# Patient Record
Sex: MALE | Race: OTHER | HISPANIC OR LATINO
[De-identification: names, ages, dates, MRNs, and addresses within clinical notes are randomized per-mention and may not be internally consistent; named-entity substitution may affect disease eponyms.]

---

## 2018-05-24 ENCOUNTER — APPOINTMENT (OUTPATIENT)
Dept: NEPHROLOGY | Facility: CLINIC | Age: 18
End: 2018-05-24
Payer: MEDICAID

## 2018-05-24 VITALS
WEIGHT: 178.57 LBS | DIASTOLIC BLOOD PRESSURE: 91 MMHG | SYSTOLIC BLOOD PRESSURE: 145 MMHG | HEIGHT: 65.35 IN | OXYGEN SATURATION: 98 % | HEART RATE: 83 BPM | BODY MASS INDEX: 29.39 KG/M2

## 2018-05-24 DIAGNOSIS — D50.9 IRON DEFICIENCY ANEMIA, UNSPECIFIED: ICD-10-CM

## 2018-05-24 DIAGNOSIS — N25.81 SECONDARY HYPERPARATHYROIDISM OF RENAL ORIGIN: ICD-10-CM

## 2018-05-24 DIAGNOSIS — Z83.3 FAMILY HISTORY OF DIABETES MELLITUS: ICD-10-CM

## 2018-05-24 DIAGNOSIS — E87.5 HYPERKALEMIA: ICD-10-CM

## 2018-05-24 DIAGNOSIS — E55.9 VITAMIN D DEFICIENCY, UNSPECIFIED: ICD-10-CM

## 2018-05-24 DIAGNOSIS — Z87.448 PERSONAL HISTORY OF OTHER DISEASES OF URINARY SYSTEM: ICD-10-CM

## 2018-05-24 DIAGNOSIS — Z87.440 PERSONAL HISTORY OF URINARY (TRACT) INFECTIONS: ICD-10-CM

## 2018-05-24 PROCEDURE — 99204 OFFICE O/P NEW MOD 45 MIN: CPT

## 2018-05-25 PROBLEM — E55.9 VITAMIN D DEFICIENCY, UNSPECIFIED: Status: ACTIVE | Noted: 2018-05-25

## 2018-05-25 PROBLEM — N25.81 SECONDARY HYPERPARATHYROIDISM, RENAL: Status: ACTIVE | Noted: 2018-05-25

## 2018-05-25 PROBLEM — E87.5 HYPERKALEMIA: Status: ACTIVE | Noted: 2018-05-25

## 2018-05-25 LAB
25(OH)D3 SERPL-MCNC: 16.7 NG/ML
ALBUMIN SERPL ELPH-MCNC: 3.8 G/DL
ANION GAP SERPL CALC-SCNC: 19 MMOL/L
APPEARANCE: CLEAR
BACTERIA: NEGATIVE
BASOPHILS # BLD AUTO: 0.05 K/UL
BASOPHILS NFR BLD AUTO: 0.5 %
BILIRUBIN URINE: NEGATIVE
BLOOD URINE: ABNORMAL
BUN SERPL-MCNC: 85 MG/DL
CALCIUM SERPL-MCNC: 8.8 MG/DL
CALCIUM SERPL-MCNC: 8.8 MG/DL
CHLORIDE SERPL-SCNC: 103 MMOL/L
CO2 SERPL-SCNC: 17 MMOL/L
COLOR: YELLOW
CREAT SERPL-MCNC: 7.79 MG/DL
CREAT SPEC-SCNC: 57 MG/DL
CREAT/PROT UR: 7.5 RATIO
EOSINOPHIL # BLD AUTO: 0.13 K/UL
EOSINOPHIL NFR BLD AUTO: 1.4 %
GLUCOSE QUALITATIVE U: 100 MG/DL
GLUCOSE SERPL-MCNC: 90 MG/DL
HBV CORE IGG+IGM SER QL: NONREACTIVE
HBV CORE IGM SER QL: NONREACTIVE
HBV SURFACE AB SER QL: NONREACTIVE
HBV SURFACE AG SER QL: NONREACTIVE
HCT VFR BLD CALC: 29.9 %
HCV AB SER QL: NONREACTIVE
HCV S/CO RATIO: 0.13 S/CO
HGB BLD-MCNC: 9.8 G/DL
HYALINE CASTS: 0 /LPF
IMM GRANULOCYTES NFR BLD AUTO: 0.2 %
KETONES URINE: NEGATIVE
LEUKOCYTE ESTERASE URINE: NEGATIVE
LYMPHOCYTES # BLD AUTO: 1.68 K/UL
LYMPHOCYTES NFR BLD AUTO: 18.1 %
MAN DIFF?: NORMAL
MCHC RBC-ENTMCNC: 28.1 PG
MCHC RBC-ENTMCNC: 32.8 GM/DL
MCV RBC AUTO: 85.7 FL
MICROSCOPIC-UA: NORMAL
MONOCYTES # BLD AUTO: 0.42 K/UL
MONOCYTES NFR BLD AUTO: 4.5 %
NEUTROPHILS # BLD AUTO: 6.96 K/UL
NEUTROPHILS NFR BLD AUTO: 75.3 %
NITRITE URINE: NEGATIVE
PARATHYROID HORMONE INTACT: 659 PG/ML
PH URINE: 6.5
PHOSPHATE SERPL-MCNC: 6 MG/DL
PLATELET # BLD AUTO: 278 K/UL
POTASSIUM SERPL-SCNC: 5.4 MMOL/L
PROT UR-MCNC: 426 MG/DL
PROTEIN URINE: 300 MG/DL
RBC # BLD: 3.49 M/UL
RBC # FLD: 12.7 %
RED BLOOD CELLS URINE: 6 /HPF
SODIUM SERPL-SCNC: 139 MMOL/L
SPECIFIC GRAVITY URINE: 1.01
SQUAMOUS EPITHELIAL CELLS: 1 /HPF
TACROLIMUS SERPL-MCNC: 3.6 NG/ML
UROBILINOGEN URINE: NEGATIVE MG/DL
WBC # FLD AUTO: 9.26 K/UL
WHITE BLOOD CELLS URINE: 2 /HPF

## 2018-05-29 LAB
CMV DNA SPEC QL NAA+PROBE: NOT DETECTED IU/ML
HBV E AG SER QL: NEGATIVE

## 2018-05-31 LAB — BKV DNA SPEC QL NAA+PROBE: NORMAL

## 2018-07-19 ENCOUNTER — APPOINTMENT (OUTPATIENT)
Dept: NEPHROLOGY | Facility: CLINIC | Age: 18
End: 2018-07-19

## 2018-09-01 ENCOUNTER — OUTPATIENT (OUTPATIENT)
Dept: OUTPATIENT SERVICES | Facility: HOSPITAL | Age: 18
LOS: 1 days | End: 2018-09-01
Payer: MEDICAID

## 2018-09-01 DIAGNOSIS — Z94.0 KIDNEY TRANSPLANT STATUS: Chronic | ICD-10-CM

## 2018-09-06 ENCOUNTER — INPATIENT (INPATIENT)
Facility: HOSPITAL | Age: 18
LOS: 8 days | Discharge: HOME | DRG: 699 | End: 2018-09-15
Attending: INTERNAL MEDICINE | Admitting: HOSPITALIST
Payer: MEDICAID

## 2018-09-06 VITALS
OXYGEN SATURATION: 99 % | DIASTOLIC BLOOD PRESSURE: 81 MMHG | RESPIRATION RATE: 18 BRPM | SYSTOLIC BLOOD PRESSURE: 137 MMHG | HEART RATE: 115 BPM | TEMPERATURE: 98 F

## 2018-09-06 DIAGNOSIS — N17.9 ACUTE KIDNEY FAILURE, UNSPECIFIED: ICD-10-CM

## 2018-09-06 DIAGNOSIS — Z94.0 KIDNEY TRANSPLANT STATUS: Chronic | ICD-10-CM

## 2018-09-06 LAB
ANION GAP SERPL CALC-SCNC: 28 MMOL/L — HIGH (ref 5–17)
APPEARANCE UR: CLEAR — SIGNIFICANT CHANGE UP
BACTERIA # UR AUTO: NEGATIVE — SIGNIFICANT CHANGE UP
BASOPHILS # BLD AUTO: 0.1 K/UL — SIGNIFICANT CHANGE UP (ref 0–0.2)
BASOPHILS NFR BLD AUTO: 0.8 % — SIGNIFICANT CHANGE UP (ref 0–2)
BILIRUB UR-MCNC: NEGATIVE — SIGNIFICANT CHANGE UP
BLD GP AB SCN SERPL QL: NEGATIVE — SIGNIFICANT CHANGE UP
BUN SERPL-MCNC: 161 MG/DL — HIGH (ref 7–23)
BURR CELLS BLD QL SMEAR: PRESENT — SIGNIFICANT CHANGE UP
CALCIUM SERPL-MCNC: 5.3 MG/DL — CRITICAL LOW (ref 8.4–10.5)
CHLORIDE SERPL-SCNC: 98 MMOL/L — SIGNIFICANT CHANGE UP (ref 96–108)
CO2 SERPL-SCNC: 12 MMOL/L — LOW (ref 22–31)
COLOR SPEC: SIGNIFICANT CHANGE UP
COMMENT - URINE: SIGNIFICANT CHANGE UP
CREAT SERPL-MCNC: 22.25 MG/DL — HIGH (ref 0.5–1.3)
DIFF PNL FLD: ABNORMAL
ELLIPTOCYTES BLD QL SMEAR: SLIGHT — SIGNIFICANT CHANGE UP
EOSINOPHIL # BLD AUTO: 0 K/UL — SIGNIFICANT CHANGE UP (ref 0–0.5)
EOSINOPHIL NFR BLD AUTO: 0.4 % — SIGNIFICANT CHANGE UP (ref 0–6)
EPI CELLS # UR: 1 /HPF — SIGNIFICANT CHANGE UP
GAS PNL BLDV: SIGNIFICANT CHANGE UP
GLUCOSE SERPL-MCNC: 134 MG/DL — HIGH (ref 70–99)
GLUCOSE UR QL: NEGATIVE — SIGNIFICANT CHANGE UP
HCT VFR BLD CALC: 19.2 % — CRITICAL LOW (ref 39–50)
HGB BLD-MCNC: 6.5 G/DL — CRITICAL LOW (ref 13–17)
HYALINE CASTS # UR AUTO: 0 /LPF — SIGNIFICANT CHANGE UP (ref 0–2)
HYPOCHROMIA BLD QL: SIGNIFICANT CHANGE UP
KETONES UR-MCNC: NEGATIVE — SIGNIFICANT CHANGE UP
LEUKOCYTE ESTERASE UR-ACNC: NEGATIVE — SIGNIFICANT CHANGE UP
LYMPHOCYTES # BLD AUTO: 1.8 K/UL — SIGNIFICANT CHANGE UP (ref 1–3.3)
LYMPHOCYTES # BLD AUTO: 25.5 % — SIGNIFICANT CHANGE UP (ref 13–44)
MCHC RBC-ENTMCNC: 27.4 PG — SIGNIFICANT CHANGE UP (ref 27–34)
MCHC RBC-ENTMCNC: 33.7 GM/DL — SIGNIFICANT CHANGE UP (ref 32–36)
MCV RBC AUTO: 81.1 FL — SIGNIFICANT CHANGE UP (ref 80–100)
MONOCYTES # BLD AUTO: 0.5 K/UL — SIGNIFICANT CHANGE UP (ref 0–0.9)
MONOCYTES NFR BLD AUTO: 6.7 % — SIGNIFICANT CHANGE UP (ref 2–14)
NEUTROPHILS # BLD AUTO: 4.7 K/UL — SIGNIFICANT CHANGE UP (ref 1.8–7.4)
NEUTROPHILS NFR BLD AUTO: 66.6 % — SIGNIFICANT CHANGE UP (ref 43–77)
NITRITE UR-MCNC: NEGATIVE — SIGNIFICANT CHANGE UP
PH UR: 6 — SIGNIFICANT CHANGE UP (ref 5–8)
PLAT MORPH BLD: NORMAL — SIGNIFICANT CHANGE UP
PLATELET # BLD AUTO: 142 K/UL — LOW (ref 150–400)
POIKILOCYTOSIS BLD QL AUTO: SLIGHT — SIGNIFICANT CHANGE UP
POTASSIUM SERPL-MCNC: 4.1 MMOL/L — SIGNIFICANT CHANGE UP (ref 3.5–5.3)
POTASSIUM SERPL-SCNC: 4.1 MMOL/L — SIGNIFICANT CHANGE UP (ref 3.5–5.3)
PROT UR-MCNC: ABNORMAL
RBC # BLD: 2.37 M/UL — LOW (ref 4.2–5.8)
RBC # FLD: 12.2 % — SIGNIFICANT CHANGE UP (ref 10.3–14.5)
RBC BLD AUTO: ABNORMAL
RBC CASTS # UR COMP ASSIST: 1 /HPF — SIGNIFICANT CHANGE UP (ref 0–4)
RH IG SCN BLD-IMP: POSITIVE — SIGNIFICANT CHANGE UP
RH IG SCN BLD-IMP: POSITIVE — SIGNIFICANT CHANGE UP
SCHISTOCYTES BLD QL AUTO: SLIGHT — SIGNIFICANT CHANGE UP
SODIUM SERPL-SCNC: 138 MMOL/L — SIGNIFICANT CHANGE UP (ref 135–145)
SP GR SPEC: 1.01 — SIGNIFICANT CHANGE UP (ref 1.01–1.02)
UROBILINOGEN FLD QL: NEGATIVE — SIGNIFICANT CHANGE UP
WBC # BLD: 7 K/UL — SIGNIFICANT CHANGE UP (ref 3.8–10.5)
WBC # FLD AUTO: 7 K/UL — SIGNIFICANT CHANGE UP (ref 3.8–10.5)
WBC UR QL: 12 /HPF — HIGH (ref 0–5)

## 2018-09-06 PROCEDURE — 99223 1ST HOSP IP/OBS HIGH 75: CPT | Mod: GC

## 2018-09-06 PROCEDURE — 71046 X-RAY EXAM CHEST 2 VIEWS: CPT | Mod: 26

## 2018-09-06 PROCEDURE — 99285 EMERGENCY DEPT VISIT HI MDM: CPT | Mod: 25

## 2018-09-06 PROCEDURE — 93010 ELECTROCARDIOGRAM REPORT: CPT

## 2018-09-06 NOTE — ED PROVIDER NOTE - CARE PLAN
Principal Discharge DX:	Acute renal failure superimposed on chronic kidney disease, unspecified CKD stage, unspecified acute renal failure type  Secondary Diagnosis:	Metabolic acidemia  Secondary Diagnosis:	Anemia in chronic kidney disease, unspecified CKD stage

## 2018-09-06 NOTE — ED ADULT NURSE NOTE - NSIMPLEMENTINTERV_GEN_ALL_ED
Implemented All Universal Safety Interventions:  [place] to call system. Call bell, personal items and telephone within reach. Instruct patient to call for assistance. Room bathroom lighting operational. Non-slip footwear when patient is off stretcher. Physically safe environment: no spills, clutter or unnecessary equipment. Stretcher in lowest position, wheels locked, appropriate side rails in place.

## 2018-09-06 NOTE — ED ADULT NURSE NOTE - OBJECTIVE STATEMENT
19 yo male complaining of nausea for 3 days and discomfort. pt has hx of 10 years kidney transplant, Fistula on left arm - arm precautions on left arm. Pt denies nausea and vomiting. Mild edema on both extremities, pt alerted and oriented x3, ambulating without difficulty, just complaining about generalized discomfort. IV placed on right AC 20G, blood drawn, sent to lab. pt collecting urine. Pt will start dialysis in december. Will continue to monitor closely.

## 2018-09-06 NOTE — ED PROVIDER NOTE - OBJECTIVE STATEMENT
19 yo male with PMHx of kidney transplant 10 years ago, HTN, Anemia, 17 yo male with PMHx of atrophic kidney s/p transplant 10 years ago, rejection of transplant and plans to start dialysis this month (fistula placed in December 2017), HTN, Anemia, presents with generalized malaise and nausea x 3 days. Endorses vague abdominal discomfort, abdominal distention, increased swelling in bilateral lower extremities. Denies fever, chills, shortness of breath, vomiting, diarrhea, new focal neurological symptoms, chest pain/ pressure. Patient reports makes "a normal volume" of urine two times a day.    Pt does not see PMD, only sees nephrologist Dr. Middleton

## 2018-09-06 NOTE — ED PROVIDER NOTE - MEDICAL DECISION MAKING DETAILS
MD Paris,Attending: pt seen. agree with above HPI/ROS/PE. Concerns for decreasing renal function in pt with renal transplant soon to be started on dialysis. r/o significant uremia/MA/hyperkalemia. Labs and discussion with renal

## 2018-09-07 DIAGNOSIS — D64.9 ANEMIA, UNSPECIFIED: ICD-10-CM

## 2018-09-07 DIAGNOSIS — E83.51 HYPOCALCEMIA: ICD-10-CM

## 2018-09-07 DIAGNOSIS — N18.9 CHRONIC KIDNEY DISEASE, UNSPECIFIED: ICD-10-CM

## 2018-09-07 DIAGNOSIS — E83.42 HYPOMAGNESEMIA: ICD-10-CM

## 2018-09-07 DIAGNOSIS — R10.9 UNSPECIFIED ABDOMINAL PAIN: ICD-10-CM

## 2018-09-07 DIAGNOSIS — N18.6 END STAGE RENAL DISEASE: ICD-10-CM

## 2018-09-07 DIAGNOSIS — Z29.9 ENCOUNTER FOR PROPHYLACTIC MEASURES, UNSPECIFIED: ICD-10-CM

## 2018-09-07 DIAGNOSIS — T86.12 KIDNEY TRANSPLANT FAILURE: Chronic | ICD-10-CM

## 2018-09-07 DIAGNOSIS — E83.39 OTHER DISORDERS OF PHOSPHORUS METABOLISM: ICD-10-CM

## 2018-09-07 DIAGNOSIS — N17.9 ACUTE KIDNEY FAILURE, UNSPECIFIED: ICD-10-CM

## 2018-09-07 DIAGNOSIS — T86.11 KIDNEY TRANSPLANT REJECTION: ICD-10-CM

## 2018-09-07 DIAGNOSIS — I10 ESSENTIAL (PRIMARY) HYPERTENSION: ICD-10-CM

## 2018-09-07 DIAGNOSIS — Z94.0 KIDNEY TRANSPLANT STATUS: ICD-10-CM

## 2018-09-07 DIAGNOSIS — R94.31 ABNORMAL ELECTROCARDIOGRAM [ECG] [EKG]: ICD-10-CM

## 2018-09-07 DIAGNOSIS — E87.2 ACIDOSIS: ICD-10-CM

## 2018-09-07 LAB
24R-OH-CALCIDIOL SERPL-MCNC: 11.9 NG/ML — LOW (ref 30–80)
ALBUMIN SERPL ELPH-MCNC: 4.1 G/DL — SIGNIFICANT CHANGE UP (ref 3.3–5)
ALP SERPL-CCNC: 84 U/L — SIGNIFICANT CHANGE UP (ref 40–120)
ALT FLD-CCNC: 7 U/L — LOW (ref 10–45)
ANION GAP SERPL CALC-SCNC: 29 MMOL/L — HIGH (ref 5–17)
AST SERPL-CCNC: 7 U/L — LOW (ref 10–40)
BILIRUB DIRECT SERPL-MCNC: 0.1 MG/DL — SIGNIFICANT CHANGE UP (ref 0–0.2)
BILIRUB INDIRECT FLD-MCNC: 0.3 MG/DL — SIGNIFICANT CHANGE UP (ref 0.2–1)
BILIRUB SERPL-MCNC: 0.4 MG/DL — SIGNIFICANT CHANGE UP (ref 0.2–1.2)
BUN SERPL-MCNC: 157 MG/DL — HIGH (ref 7–23)
CALCIUM SERPL-MCNC: 5.2 MG/DL — CRITICAL LOW (ref 8.4–10.5)
CALCIUM SERPL-MCNC: 5.3 MG/DL — CRITICAL LOW (ref 8.4–10.5)
CHLORIDE SERPL-SCNC: 97 MMOL/L — SIGNIFICANT CHANGE UP (ref 96–108)
CO2 SERPL-SCNC: 12 MMOL/L — LOW (ref 22–31)
CREAT SERPL-MCNC: 21.47 MG/DL — HIGH (ref 0.5–1.3)
FERRITIN SERPL-MCNC: 353 NG/ML — SIGNIFICANT CHANGE UP (ref 30–400)
GLUCOSE SERPL-MCNC: 96 MG/DL — SIGNIFICANT CHANGE UP (ref 70–99)
HAPTOGLOB SERPL-MCNC: 189 MG/DL — SIGNIFICANT CHANGE UP (ref 34–200)
HAV IGM SER-ACNC: SIGNIFICANT CHANGE UP
HBV CORE IGM SER-ACNC: SIGNIFICANT CHANGE UP
HBV SURFACE AG SER-ACNC: SIGNIFICANT CHANGE UP
HCT VFR BLD CALC: 24.4 % — LOW (ref 39–50)
HCV AB S/CO SERPL IA: 0.11 S/CO — SIGNIFICANT CHANGE UP
HCV AB SERPL-IMP: SIGNIFICANT CHANGE UP
HGB BLD-MCNC: 8.4 G/DL — LOW (ref 13–17)
IRON SATN MFR SERPL: 137 UG/DL — SIGNIFICANT CHANGE UP (ref 45–165)
LDH SERPL L TO P-CCNC: 328 U/L — HIGH (ref 50–242)
MAGNESIUM SERPL-MCNC: 1.5 MG/DL — LOW (ref 1.6–2.6)
MAGNESIUM SERPL-MCNC: 1.5 MG/DL — LOW (ref 1.6–2.6)
MCHC RBC-ENTMCNC: 28.4 PG — SIGNIFICANT CHANGE UP (ref 27–34)
MCHC RBC-ENTMCNC: 34.3 GM/DL — SIGNIFICANT CHANGE UP (ref 32–36)
MCV RBC AUTO: 82.8 FL — SIGNIFICANT CHANGE UP (ref 80–100)
PHOSPHATE SERPL-MCNC: 10.2 MG/DL — HIGH (ref 2.5–4.5)
PHOSPHATE SERPL-MCNC: 10.7 MG/DL — HIGH (ref 2.5–4.5)
PLATELET # BLD AUTO: 146 K/UL — LOW (ref 150–400)
POTASSIUM SERPL-MCNC: 3.9 MMOL/L — SIGNIFICANT CHANGE UP (ref 3.5–5.3)
POTASSIUM SERPL-SCNC: 3.9 MMOL/L — SIGNIFICANT CHANGE UP (ref 3.5–5.3)
PROT SERPL-MCNC: 7.5 G/DL — SIGNIFICANT CHANGE UP (ref 6–8.3)
PTH-INTACT FLD-MCNC: 974 PG/ML — HIGH (ref 15–65)
RBC # BLD: 2.95 M/UL — LOW (ref 4.2–5.8)
RBC # BLD: 2.97 M/UL — LOW (ref 4.2–5.8)
RBC # FLD: 12.5 % — SIGNIFICANT CHANGE UP (ref 10.3–14.5)
RETICS #: 66.3 K/UL — SIGNIFICANT CHANGE UP (ref 25–125)
RETICS/RBC NFR: 2.2 % — SIGNIFICANT CHANGE UP (ref 0.5–2.5)
SODIUM SERPL-SCNC: 138 MMOL/L — SIGNIFICANT CHANGE UP (ref 135–145)
TACROLIMUS SERPL-MCNC: <2 NG/ML — SIGNIFICANT CHANGE UP
WBC # BLD: 8.4 K/UL — SIGNIFICANT CHANGE UP (ref 3.8–10.5)
WBC # FLD AUTO: 8.4 K/UL — SIGNIFICANT CHANGE UP (ref 3.8–10.5)

## 2018-09-07 PROCEDURE — 99233 SBSQ HOSP IP/OBS HIGH 50: CPT

## 2018-09-07 PROCEDURE — 74176 CT ABD & PELVIS W/O CONTRAST: CPT | Mod: 26

## 2018-09-07 PROCEDURE — 99223 1ST HOSP IP/OBS HIGH 75: CPT | Mod: GC

## 2018-09-07 RX ORDER — SEVELAMER CARBONATE 2400 MG/1
1600 POWDER, FOR SUSPENSION ORAL THREE TIMES A DAY
Qty: 0 | Refills: 0 | Status: DISCONTINUED | OUTPATIENT
Start: 2018-09-07 | End: 2018-09-15

## 2018-09-07 RX ORDER — TACROLIMUS 5 MG/1
1 CAPSULE ORAL
Qty: 0 | Refills: 0 | COMMUNITY

## 2018-09-07 RX ORDER — TACROLIMUS 5 MG/1
6 CAPSULE ORAL
Qty: 0 | Refills: 0 | COMMUNITY

## 2018-09-07 RX ORDER — AMLODIPINE BESYLATE 2.5 MG/1
10 TABLET ORAL DAILY
Qty: 0 | Refills: 0 | Status: DISCONTINUED | OUTPATIENT
Start: 2018-09-07 | End: 2018-09-15

## 2018-09-07 RX ORDER — FERROUS SULFATE 325(65) MG
325 TABLET ORAL DAILY
Qty: 0 | Refills: 0 | Status: DISCONTINUED | OUTPATIENT
Start: 2018-09-07 | End: 2018-09-15

## 2018-09-07 RX ORDER — TACROLIMUS 5 MG/1
6 CAPSULE ORAL EVERY 12 HOURS
Qty: 0 | Refills: 0 | Status: DISCONTINUED | OUTPATIENT
Start: 2018-09-07 | End: 2018-09-07

## 2018-09-07 RX ORDER — FERROUS SULFATE 325(65) MG
1 TABLET ORAL
Qty: 0 | Refills: 0 | COMMUNITY

## 2018-09-07 RX ORDER — AMLODIPINE BESYLATE 2.5 MG/1
1 TABLET ORAL
Qty: 0 | Refills: 0 | COMMUNITY

## 2018-09-07 RX ORDER — CALCIUM GLUCONATE 100 MG/ML
1 VIAL (ML) INTRAVENOUS ONCE
Qty: 0 | Refills: 0 | Status: COMPLETED | OUTPATIENT
Start: 2018-09-07 | End: 2018-09-07

## 2018-09-07 RX ORDER — MAGNESIUM SULFATE 500 MG/ML
2 VIAL (ML) INJECTION ONCE
Qty: 0 | Refills: 0 | Status: COMPLETED | OUTPATIENT
Start: 2018-09-07 | End: 2018-09-07

## 2018-09-07 RX ORDER — SODIUM BICARBONATE 1 MEQ/ML
1950 SYRINGE (ML) INTRAVENOUS
Qty: 0 | Refills: 0 | Status: DISCONTINUED | OUTPATIENT
Start: 2018-09-07 | End: 2018-09-07

## 2018-09-07 RX ORDER — CALCIUM GLUCONATE 100 MG/ML
2 VIAL (ML) INTRAVENOUS ONCE
Qty: 0 | Refills: 0 | Status: DISCONTINUED | OUTPATIENT
Start: 2018-09-07 | End: 2018-09-07

## 2018-09-07 RX ORDER — CALCITRIOL 0.5 UG/1
0.25 CAPSULE ORAL DAILY
Qty: 0 | Refills: 0 | Status: DISCONTINUED | OUTPATIENT
Start: 2018-09-07 | End: 2018-09-09

## 2018-09-07 RX ORDER — SODIUM BICARBONATE 1 MEQ/ML
1950 SYRINGE (ML) INTRAVENOUS
Qty: 0 | Refills: 0 | Status: DISCONTINUED | OUTPATIENT
Start: 2018-09-07 | End: 2018-09-09

## 2018-09-07 RX ORDER — MYCOPHENOLATE MOFETIL 250 MG/1
400 CAPSULE ORAL EVERY 12 HOURS
Qty: 0 | Refills: 0 | Status: DISCONTINUED | OUTPATIENT
Start: 2018-09-07 | End: 2018-09-07

## 2018-09-07 RX ORDER — ONDANSETRON 8 MG/1
4 TABLET, FILM COATED ORAL ONCE
Qty: 0 | Refills: 0 | Status: COMPLETED | OUTPATIENT
Start: 2018-09-07 | End: 2018-09-07

## 2018-09-07 RX ADMIN — Medication 5 MILLIGRAM(S): at 06:10

## 2018-09-07 RX ADMIN — Medication 1950 MILLIGRAM(S): at 20:36

## 2018-09-07 RX ADMIN — Medication 1 TABLET(S): at 06:20

## 2018-09-07 RX ADMIN — SEVELAMER CARBONATE 1600 MILLIGRAM(S): 2400 POWDER, FOR SUSPENSION ORAL at 12:36

## 2018-09-07 RX ADMIN — Medication 325 MILLIGRAM(S): at 12:36

## 2018-09-07 RX ADMIN — ONDANSETRON 4 MILLIGRAM(S): 8 TABLET, FILM COATED ORAL at 03:30

## 2018-09-07 RX ADMIN — SEVELAMER CARBONATE 1600 MILLIGRAM(S): 2400 POWDER, FOR SUSPENSION ORAL at 20:35

## 2018-09-07 RX ADMIN — Medication 50 GRAM(S): at 16:40

## 2018-09-07 RX ADMIN — Medication 200 GRAM(S): at 11:22

## 2018-09-07 RX ADMIN — AMLODIPINE BESYLATE 10 MILLIGRAM(S): 2.5 TABLET ORAL at 06:10

## 2018-09-07 RX ADMIN — CALCITRIOL 0.25 MICROGRAM(S): 0.5 CAPSULE ORAL at 12:36

## 2018-09-07 RX ADMIN — Medication 1950 MILLIGRAM(S): at 06:21

## 2018-09-07 RX ADMIN — SEVELAMER CARBONATE 1600 MILLIGRAM(S): 2400 POWDER, FOR SUSPENSION ORAL at 06:20

## 2018-09-07 NOTE — CONSULT NOTE ADULT - PROBLEM SELECTOR RECOMMENDATION 5
Patient with hyperphosphatemia in setting of ESRD. Serum phosphorus noted to be elevated at 10.7. Would stop calcitriol as this can increase serum phosphorus level. Continue with sevelamer 1600 mg TID with meals. However will start on phoslo 667 mg TID with meals as well. Check intact PTH levels. Monitor serum phosphorus levels.

## 2018-09-07 NOTE — H&P ADULT - PROBLEM SELECTOR PLAN 1
plan to start RUTH Presenting with ARF leading to metabolic acidosis pH 7.26, AG 28, , SCr 22 (baseline 7.8 in 5/2018) in kidney transplant recipient. Pt was planning for initiating HD through LUE AVF (placed 12/2017). ARF likely progression of chronic kidney transplant rejection. Pt is breathing comfortably on room air.   -nephrology consulted, to see in AM  -Initiation of HD inpatient  -bruit present of LUE AVF, left arm precautions  -continue oral bicarb Presenting with ARF leading to metabolic acidosis pH 7.26, AG 28, , SCr 22 (baseline 7.8 in 5/2018) in kidney transplant recipient. Pt was planning for initiating HD through LUE AVF (placed 12/2017). ARF likely progression of chronic kidney transplant rejection. Pt is breathing comfortably on room air.   -nephrology consulted, to see in AM  -Initiation of HD inpatient  -bruit present of LUE AVF, left arm precautions  -continue oral bicarb  -symptomatic hypocalcemia with paresthesias, will give oral calcium repletion Presenting with ARF leading to metabolic acidosis pH 7.26, AG 28, , SCr 22 (baseline 7.8 in 5/2018), phos 10, Ca 5.3 in kidney transplant recipient. Pt was planning for initiating HD through LUE AVF (placed 12/2017). ARF likely progression of chronic kidney transplant rejection. Low suspicion for infectious etiology such as HIV or BK virus nephropathy. No sign hematuria, low suspicion for vasculitis.  -nephrology consulted, to see in AM  -Initiation of HD inpatient  -bruit present of LUE AVF, left arm precautions  -continue oral bicarb and calcium supplement, start phos binder  -f/u BK virus level Presenting with ARF leading to metabolic acidosis pH 7.26, AG 28, , SCr 22 (baseline 7.8 in 5/2018), phos 10, Ca 5.3 in kidney transplant recipient. Pt was planning for initiating HD through LUE AVF (placed 12/2017). ARF likely progression of chronic kidney transplant rejection. Low suspicion for infectious etiology such as HIV or BK virus nephropathy. Possibly element of nephrotoxicity 2/2 tacrolimus. No sign hematuria, low suspicion for vasculitis.  -nephrology consulted, to see in AM  -Initiation of HD inpatient  -bruit present of LUE AVF, left arm precautions  -continue oral bicarb and calcium supplement, start phos binder  -f/u BK virus level Presenting with ARF leading to metabolic acidosis pH 7.26, AG 28, , SCr 22 (baseline 7.8 in 5/2018), phos 10, Ca 5.3 in kidney transplant recipient. Pt was planning for initiating HD through LUE AVF (placed 12/2017). ARF likely progression of chronic kidney transplant rejection. Low suspicion for infectious etiology such as HIV or BK virus nephropathy. Possibly element of nephrotoxicity 2/2 tacrolimus. No sign hematuria, low suspicion for vasculitis.  -nephrology consulted, to see in AM  -Initiation of HD inpatient  -bruit present of LUE AVF, left arm precautions  -continue oral bicarb and calcium supplement, vitamin D. Start phos binder  -f/u BK virus level, intact PTH Presenting with ARF leading to metabolic acidosis pH 7.26, AG 28, , SCr 22 (baseline 7.8 in 5/2018), phos 10, Ca 5.3 in kidney transplant recipient. Pt was planning for initiating HD through LUE AVF (placed 12/2017). ARF likely progression of chronic kidney transplant rejection. Low suspicion for infectious etiology such as HIV or BK virus nephropathy. Possibly element of nephrotoxicity 2/2 tacrolimus. No sign hematuria, low suspicion for vasculitis.  -nephrology consulted, to see in AM  -Initiation of HD inpatient  -bruit present of LUE AVF, left arm precautions  -continue oral bicarb and calcium supplement, vitamin D. Start phos binder  -monitor on telemetry  -f/u BK virus level, intact PTH Presenting with ARF leading to metabolic acidosis pH 7.26, AG 28, , SCr 22 (baseline 7.8 in 5/2018), phos 10, Ca 5.3 in kidney transplant recipient. Pt was planning for initiating HD through LUE AVF (placed 12/2017). ARF likely progression of chronic kidney transplant rejection. Low suspicion for infectious etiology such as HIV or BK virus nephropathy. Possibly element of nephrotoxicity 2/2 tacrolimus. No sign hematuria, low suspicion for vasculitis.  -nephrology consulted, to see in AM  -Initiation of HD inpatient  -bruit present of LUE AVF, left arm precautions  -continue oral bicarb and calcium supplement, vitamin D. Start phos binder  -monitor on telemetry  -f/u BK virus level, intact PTH & vitamin D level  -Renally dose all meds and monitor electrolytes carefully.

## 2018-09-07 NOTE — H&P ADULT - PROBLEM SELECTOR PLAN 5
DVT:  Diet: renal replacement -continue home amlodipine Generalized abd pain associated with nausea and mild tenderness LLQ. LFTs wnl. Likely 2/2 edema/ascites  -continue to monitor Generalized abd pain associated with nausea and mild tenderness LLQ. LFTs wnl. Likely 2/2 edema/ascites versus internal bleeding  -check CT abdomen/pelvis to r/o intraabdominal pathology; no signs of acute abdomen at this point. -Patient has significant hyperphosphatemia. Would start phosphate binders and if it does not correct today by HD, patient will need telemetry bed.

## 2018-09-07 NOTE — H&P ADULT - ASSESSMENT
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

## 2018-09-07 NOTE — H&P ADULT - PROBLEM SELECTOR PLAN 2
-continue home prednisone -continue home prednisone 5mg qD, MMF 400mg q12h, tacrolimus 6mg q12h  -f/u tacrolimus dose in AM -Unclear if patient is adherent to therapy given last prescribed medication for his tacrolimus and mycophenolate was several years ago (see [employer]). Check tacrolimus level and renal to clarify appropriate dosing. Overnight renal fellow agrees.

## 2018-09-07 NOTE — H&P ADULT - NSHPREVIEWOFSYSTEMS_GEN_ALL_CORE
CONSTITUTIONAL: No weakness, fevers or chills  EYES/ENT: No visual changes;  No dysphagia  NECK: No pain or stiffness  RESPIRATORY: No cough, wheezing, hemoptysis; +SOB with deep inspiration  CARDIOVASCULAR: No chest pain or palpitations; +LE edema  GASTROINTESTINAL: +abd pain and nausea. No vomiting, or hematemesis; No diarrhea or constipation. No melena or hematochezia.  GENITOURINARY: No dysuria, frequency or hematuria  NEUROLOGICAL: No numbness or weakness  SKIN: No itching, burning, rashes, or lesions   All other review of systems is negative unless indicated above. CONSTITUTIONAL: No weakness, fevers or chills  EYES/ENT: No visual changes;  No dysphagia  NECK: No pain or stiffness  RESPIRATORY: No cough, wheezing, hemoptysis; +SOB with deep inspiration  CARDIOVASCULAR: No chest pain or palpitations; +LE edema  GASTROINTESTINAL: +abd pain and nausea. No vomiting, or hematemesis; No diarrhea or constipation. No melena or hematochezia.  GENITOURINARY: No dysuria, frequency or hematuria  NEUROLOGICAL: No numbness or weakness. +tingling or hands, no perioral numbness  SKIN: No itching, burning, rashes, or lesions   All other review of systems is negative unless indicated above. CONSTITUTIONAL: No fevers or chills  EYES/ENT: No visual changes;  No dysphagia  NECK: No pain or stiffness  RESPIRATORY: No cough, wheezing, hemoptysis; +SOB with deep inspiration  CARDIOVASCULAR: No chest pain or palpitations; +LE edema  GASTROINTESTINAL: +abd pain and nausea. No vomiting, or hematemesis; No diarrhea or constipation. No melena or hematochezia. +dark stools but chronic while on iron.   GENITOURINARY: No dysuria, frequency or hematuria  NEUROLOGICAL: No numbness or weakness. +tingling or hands, no perioral numbness  SKIN: No itching, burning, rashes, or lesions   All other review of systems is negative unless indicated above.

## 2018-09-07 NOTE — H&P ADULT - PROBLEM SELECTOR PROBLEM 1
Anemia Acute renal failure superimposed on stage 5 chronic kidney disease, not on chronic dialysis, unspecified acute renal failure type

## 2018-09-07 NOTE — CONSULT NOTE ADULT - PROBLEM SELECTOR RECOMMENDATION 4
Patient with hypocalcemia in setting of ESRD. Serum calcium noted to be 5.3. Will do HD today with high calcium bath today. If serum calcium remains low after HD or patient is symptomatic, then will need to supplement with IV calcium gluconate. Check intact PTH level. Monitor serum calcium.

## 2018-09-07 NOTE — CONSULT NOTE ADULT - PROBLEM SELECTOR RECOMMENDATION 9
Patient with h/o progressive advanced CKD in setting of chronic rejection. Labs done as outpatient showed Scr. of 7.79. However on labs done in ER, Scr. is elevated at 22.25 with BUN of 160. Patient appears uremic on examination. Patient has left UE AVF present for vascular access. Will initiate HD via AVF in view of worsening renal function and uremia. Plan is to do first session today and then second session of HD tomorrow as well. Monitor BMP daily.

## 2018-09-07 NOTE — CONSULT NOTE ADULT - PROBLEM SELECTOR RECOMMENDATION 3
Patient with anemia in setting of ESRD. Hb noted to be below goal. Check iron studies (ferritin, serum iron, and TIBC) to assess iron stores. Will start on RUTH therapy once on TIW schedule. Monitor Hb.

## 2018-09-07 NOTE — H&P ADULT - NSHPPHYSICALEXAM_GEN_ALL_CORE
GENERAL: No acute distress, well-developed  HEAD:  Atraumatic, Normocephalic  ENT: PERRLA, conjunctiva and sclera clear, Neck supple, No JVD, moist mucosa, oropharynx clear  CHEST/LUNG: Rales at bases bilaterally otherwise clear; No wheeze, equal breath sounds bilaterally, respirations nonlabored  BACK: No spinal tenderness, no CVA tenderness  HEART: Regular rate and rhythm; No murmurs, rubs, or gallops  ABDOMEN: Soft, mild LLQ tenderness, no masses, no guarding, Nondistended; Bowel sounds present  EXTREMITIES:  No clubbing, cyanosis, 1+ pretibial edema. Pedal pulses palpable  PSYCH: Nl behavior, nl affect  NEUROLOGY: AAOx3, non-focal, moves all extremities spontaneously, +paresthesias b/l hands, no perioral numbness, +asterixis of flapping hands on dorsiflexion  SKIN: Normal color, No rashes or lesions GENERAL: No acute distress, well-developed  HEAD:  Atraumatic, Normocephalic  ENT: PERRLA, conjunctiva and sclera clear, Neck supple, No JVD, moist mucosa, oropharynx clear  CHEST/LUNG: Rales at bases bilaterally otherwise clear; No wheeze, equal breath sounds bilaterally, respirations nonlabored. +bruit LUE AVF  BACK: No spinal tenderness, no CVA tenderness  HEART: Regular rate and rhythm; No murmurs, rubs, or gallops  ABDOMEN: Soft, mild LLQ tenderness, no masses, no guarding, Nondistended; Bowel sounds present  EXTREMITIES:  No clubbing, cyanosis, 1+ pretibial edema. Pedal pulses palpable  PSYCH: Nl behavior, nl affect  NEUROLOGY: AAOx3, non-focal, moves all extremities spontaneously, +paresthesias b/l hands, no perioral numbness, +asterixis of flapping hands on dorsiflexion  SKIN: Normal color, No rashes or lesions Vital Signs Last 24 Hrs  T(C): 36.8 (07 Sep 2018 04:51), Max: 36.8 (06 Sep 2018 20:24)  T(F): 98.2 (07 Sep 2018 04:51), Max: 98.2 (06 Sep 2018 20:24)  HR: 97 (07 Sep 2018 04:51) (92 - 115)  BP: 132/74 (07 Sep 2018 04:51) (127/72 - 137/81)  BP(mean): --  RR: 18 (07 Sep 2018 04:51) (18 - 18)  SpO2: 98% (07 Sep 2018 04:51) (98% - 100%)    GENERAL: No acute distress, well-developed  HEAD:  Atraumatic, Normocephalic  ENT: PERRLA, conjunctiva and sclera clear, Neck supple, No JVD, moist mucosa, oropharynx clear  Lungs: Rales at bases bilaterally otherwise clear; No wheeze, equal breath sounds bilaterally, respirations nonlabored.   BACK: No spinal tenderness, no CVA tenderness  HEART: Regular rate and rhythm; No murmurs, rubs, or gallops  ABDOMEN: Soft, mild LLQ tenderness, no masses, no guarding, Nondistended; Bowel sounds present, no flank ecchymosis.   EXTREMITIES:  No clubbing, cyanosis, 1+ pretibial edema. Pedal pulses palpable  VASCULAR: +left upper arm with fistula and thrill.   PSYCH: Nl behavior, nl affect  NEUROLOGY: AAOx3, non-focal, moves all extremities spontaneously,  no perioral numbness, +asterixis   SKIN: Normal color, No rashes or lesions

## 2018-09-07 NOTE — H&P ADULT - PROBLEM SELECTOR PLAN 6
DVT: hold pharmacologic ppx due to risk uremic bleed  Diet: renal replacement  Activity: ambulate as tolerated -continue home amlodipine -Corrected calcium is about 5.2 mg/dL. Will replete cautiously and recheck vitamin D, PTH, and calcium. Renal fellow agrees.

## 2018-09-07 NOTE — H&P ADULT - NSHPOUTPATIENTPROVIDERS_GEN_ALL_CORE
Nephrologist Dr. Shonda Middleton Nephrologist Dr. Shonda Middleton  Vascular surgeon Dr. Christofer Sanderson ([facility])

## 2018-09-07 NOTE — ED ADULT NURSE REASSESSMENT NOTE - NS ED NURSE REASSESS COMMENT FT1
Started blood transfusion, pt tolerating well, with stable vitals signs. No sings of acute distress noted at this moment, will continue to monitor closely.

## 2018-09-07 NOTE — H&P ADULT - PROBLEM SELECTOR PLAN 3
-continue iron tab Likely 2/2 CKD. Presents with acute drop in Hg with , pt is at risk for uremic bleed but denies GIB or other source blood loss  -maintain Hg >7  -s/p 1u pRBC, will give 2nd unit  -continue iron tab  -f/u iron studies (though s/p 1u pRBC), hemolysis labs Likely 2/2 CKD. Presents with acute drop in Hg with , pt is at risk for uremic bleed but denies GIB or other source blood loss. Unclear if this abdominal pain is internal bleeding or not. Will repeat CBC to see if there is appropriate response and will check CT abdomen/pelvis noncontrast to r/o RP bleed. No skin changes to support RP bleed.   -maintain Hg >7  -s/p 1u pRBC, will give 2nd unit  -continue iron tab  -f/u iron studies (though s/p 1u pRBC), check hemolysis labs (less likely); less likely TTP despite small schistocytes on automated diff. Likely 2/2 CKD. Presents with acute drop in Hg with , pt is at risk for uremic bleed. He has dark BM but supposedly on iron. His last BM was over 24-hr ago and formed, making this less likely to be overt upper GI bleeeding. He has no hematochezia either. Unclear if this abdominal pain is internal bleeding or not. Will repeat CBC to see if there is appropriate response and will check CT abdomen/pelvis noncontrast to r/o RP bleed. No skin changes to support RP bleed.   -maintain Hg >7  -s/p 1u pRBC, will give 2nd unit  -continue iron tab  -f/u iron studies (though s/p 1u pRBC), check hemolysis labs (less likely); less likely TTP despite small schistocytes on automated diff.

## 2018-09-07 NOTE — CONSULT NOTE ADULT - SUBJECTIVE AND OBJECTIVE BOX
[facility] Division of Kidney Diseases & Hypertension  INITIAL CONSULT NOTE  [phone]--------------------------------------------------------------------------------    HPI: 18 year old male with h/o CKD stage V, HTN, kidney transplant presented with complains of nausea and generalized weakness for the past two days. Nephrology was consulted for managment of ESRD. Patient has history of CKD due to chronic reflux nephropathy and was following up with pediatric nephrology. Patient had DDRT done on 2008 and was maintained on tacrolimus, MMF, and prednisone. However renal function worsened and biopsy showed changes consistent with chronic rejection and hypertensive changes. Therefore patient had left AVF placed by vascular surgeon. Patient was recently seen by Dr. Middleton in office on 5/24/18 and labs done during visit showed elevated Sc.r of 7.79. However on labs done today in ER, Scr. was elevated at 22.25 with BUN of 161. Currently patient has complains of nausea and generalized weakness. However he denies SOB, CP, abdominal pain, or LE edema.         PAST HISTORY  --------------------------------------------------------------------------------  PAST MEDICAL & SURGICAL HISTORY:  Anemia  Hypertension  Atrophic kidney  Kidney Transplant: nov 2007 (due to renal dysplasia)  Kidney transplant failure and rejection  H/O kidney transplant    FAMILY HISTORY:  No pertinent family history in first degree relatives    PAST SOCIAL HISTORY:    ALLERGIES & MEDICATIONS  --------------------------------------------------------------------------------  Allergies    No Known Allergies    Intolerances      Standing Inpatient Medications  amLODIPine   Tablet 10 milliGRAM(s) Oral daily  calcitriol   Capsule 0.25 MICROGram(s) Oral daily  calcium carbonate 1250 mG  + Vitamin D (OsCal 500 + D) 1 Tablet(s) Oral daily  ferrous    sulfate 325 milliGRAM(s) Oral daily  predniSONE   Tablet 5 milliGRAM(s) Oral daily  sevelamer hydrochloride 1600 milliGRAM(s) Oral three times a day  sodium bicarbonate 1950 milliGRAM(s) Oral two times a day    PRN Inpatient Medications      REVIEW OF SYSTEMS  --------------------------------------------------------------------------------  Gen: No  fevers/chills, weakness  Skin: No rashes  Head/Eyes/Ears/Mouth: No headache; Normal hearing; Normal vision w/o blurriness;   Respiratory: No dyspnea, cough, wheezing, hemoptysis  CV: No chest pain,   GI: No abdominal pain, diarrhea, constipation, nausea, vomiting  : No increased frequency, dysuria, hematuria, nocturia  MSK: No joint pain/swelling;   Neuro: No dizziness/lightheadedness, weakness, seizures    All other systems were reviewed and are negative, except as noted.    VITALS/PHYSICAL EXAM  --------------------------------------------------------------------------------  T(C): 36.9 (09-07-18 @ 15:54), Max: 37.1 (09-07-18 @ 15:24)  HR: 106 (09-07-18 @ 15:54) (92 - 115)  BP: 127/68 (09-07-18 @ 15:54) (121/69 - 137/81)  RR: 18 (09-07-18 @ 15:54) (18 - 18)  SpO2: 95% (09-07-18 @ 15:54) (95% - 100%)  Wt(kg): --  Height (cm): 167.64 (09-07-18 @ 03:14)  Weight (kg): 82.1 (09-07-18 @ 04:51)  BMI (kg/m2): 29.2 (09-07-18 @ 04:51)  BSA (m2): 1.92 (09-07-18 @ 04:51)      09-06-18 @ 07:01  -  09-07-18 @ 07:00  --------------------------------------------------------  IN: 420 mL / OUT: 0 mL / NET: 420 mL    09-07-18 @ 07:01  -  09-07-18 @ 16:50  --------------------------------------------------------  IN: 50 mL / OUT: 0 mL / NET: 50 mL      Physical Exam:  	Gen: NAD  	HEENT: no JVD  	Pulm: CTA B/L  	CV:  S1S2  	Abd: +BS, soft   	Ext: No B/L Lower ext edema  	Neuro: Asterixis +  	Skin: Warm and dry   	Vascular access: Left UE AVF; good thrill and bruit present.     LABS/STUDIES  --------------------------------------------------------------------------------              8.4    8.4   >-----------<  146      [09-07-18 @ 10:33]              24.4     138  |  97  |  157  ----------------------------<  96      [09-07-18 @ 10:31]  3.9   |  12  |  21.47        Ca     5.3     [09-07-18 @ 10:31]      Mg     1.5     [09-07-18 @ 10:31]      Phos  10.7     [09-07-18 @ 10:31]    TPro  7.5  /  Alb  4.1  /  TBili  0.4  /  DBili  0.1  /  AST  7   /  ALT  7   /  AlkPhos  84  [09-06-18 @ 21:01]              [09-07-18 @ 10:31]    Creatinine Trend:  SCr 21.47 [09-07 @ 10:31]  SCr 22.25 [09-06 @ 21:01]    Urinalysis - [09-06-18 @ 21:01]      Color Light Yellow / Appearance Clear / SG 1.012 / pH 6.0      Gluc Negative / Ketone Negative  / Bili Negative / Urobili Negative       Blood Small / Protein 300 mg/dL / Leuk Est Negative / Nitrite Negative      RBC 1 / WBC 12 / Hyaline 0 / Gran  / Sq Epi  / Non Sq Epi 1 / Bacteria Negative      Iron 137, TIBC --, %sat --      [09-07-18 @ 12:05]  Vitamin D (25OH) 11.9      [09-07-18 @ 12:05]    HBsAg Nonreact      [09-07-18 @ 04:51]  HCV 0.11, Nonreact      [09-07-18 @ 04:51]

## 2018-09-07 NOTE — H&P ADULT - NSHPLABSRESULTS_GEN_ALL_CORE
6.5    7.0   )-----------( 142      ( 06 Sep 2018 21:01 )             19.2     CBC Full  -  ( 06 Sep 2018 21:01 )  WBC Count : 7.0 K/uL  Hemoglobin : 6.5 g/dL  Hematocrit : 19.2 %  Platelet Count - Automated : 142 K/uL  Mean Cell Volume : 81.1 fl  Mean Cell Hemoglobin : 27.4 pg  Mean Cell Hemoglobin Concentration : 33.7 gm/dL  Auto Neutrophil # : 4.7 K/uL  Auto Lymphocyte # : 1.8 K/uL  Auto Monocyte # : 0.5 K/uL  Auto Eosinophil # : 0.0 K/uL  Auto Basophil # : 0.1 K/uL  Auto Neutrophil % : 66.6 %  Auto Lymphocyte % : 25.5 %  Auto Monocyte % : 6.7 %  Auto Eosinophil % : 0.4 %  Auto Basophil % : 0.8 %        138  |  98  |  161<H>  ----------------------------<  134<H>  4.1   |  12<L>  |  22.25<H>    Ca    5.3<LL>      06 Sep 2018 21:01    Creatinine Trend: 22.25<--      Urinalysis Basic - ( 06 Sep 2018 21:01 )    Color: Light Yellow / Appearance: Clear / S.012 / pH: x  Gluc: x / Ketone: Negative  / Bili: Negative / Urobili: Negative   Blood: x / Protein: 300 mg/dL / Nitrite: Negative   Leuk Esterase: Negative / RBC: 1 /hpf / WBC 12 /hpf   Sq Epi: x / Non Sq Epi: 1 /hpf / Bacteria: Negative    lipase 109  AG 28  VBG pH 7.26, pCO2 30, pO2 39    EKG NSR, rate 93, QTc 534, right axis deviation, no ST changes or TWI    MICROBIOLOGY:    IMAGING:  < from: Xray Chest 2 Views PA/Lat (18 @ 21:51) >  PROCEDURE DATE:  2018    ******PRELIMINARY REPORT******    ******PRELIMINARY REPORT******        INTERPRETATION:  no urgent/emergent findings.  ******PRELIMINARY REPORT******    < end of copied text > 6.5    7.0   )-----------( 142      ( 06 Sep 2018 21:01 )             19.2     CBC Full  -  ( 06 Sep 2018 21:01 )  WBC Count : 7.0 K/uL  Hemoglobin : 6.5 g/dL  Hematocrit : 19.2 %  Platelet Count - Automated : 142 K/uL  Mean Cell Volume : 81.1 fl  Mean Cell Hemoglobin : 27.4 pg  Mean Cell Hemoglobin Concentration : 33.7 gm/dL  Auto Neutrophil # : 4.7 K/uL  Auto Lymphocyte # : 1.8 K/uL  Auto Monocyte # : 0.5 K/uL  Auto Eosinophil # : 0.0 K/uL  Auto Basophil # : 0.1 K/uL  Auto Neutrophil % : 66.6 %  Auto Lymphocyte % : 25.5 %  Auto Monocyte % : 6.7 %  Auto Eosinophil % : 0.4 %  Auto Basophil % : 0.8 %        138  |  98  |  161<H>  ----------------------------<  134<H>  4.1   |  12<L>  |  22.25<H>    Ca    5.3<LL>      06 Sep 2018 21:01    Creatinine Trend: 22.25<--      Urinalysis Basic - ( 06 Sep 2018 21:01 )    Color: Light Yellow / Appearance: Clear / S.012 / pH: x  Gluc: x / Ketone: Negative  / Bili: Negative / Urobili: Negative   Blood: x / Protein: 300 mg/dL / Nitrite: Negative   Leuk Esterase: Negative / RBC: 1 /hpf / WBC 12 /hpf   Sq Epi: x / Non Sq Epi: 1 /hpf / Bacteria: Negative    lipase 109  AG 28  VBG pH 7.26, pCO2 30, pO2 39         MICROBIOLOGY:    IMAGING:  < from: Xray Chest 2 Views PA/Lat (18 @ 21:51) >  PROCEDURE DATE:  2018    ******PRELIMINARY REPORT******    ******PRELIMINARY REPORT******        INTERPRETATION:  no urgent/emergent findings.  ******PRELIMINARY REPORT******    < end of copied text >    I personally reviewed & interpreted the lab findings above; CBC c/w normocytic anemia. CMP c/w severe renal dysfunction.   I personally reviewed & interpreted the radiographic findings; CT abdomen ordered and pending   I personally reviewed & interpreted the EKG findings; Prolonged QTc. right axis deviation, no ST changes or TWI

## 2018-09-07 NOTE — H&P ADULT - HISTORY OF PRESENT ILLNESS
19yo male with PMHx of atrophic kidney s/p transplant 10 years ago, rejection of transplant and plans to start dialysis this month (fistula placed 12/2017), HTN, anemia presents with generalized malaise and nausea x 3 days. 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 He called his nephrologist, Dr. Middleton, with his symptoms and she told him to come to the ED for likely urgent HD. Pt reports decreased urine production starting 3 days ago. Also endorses generalized crampy abdominal pain with radiation to the back exacerbated by eating, severity 7/10. He feels like his abdomen and legs are heavier. He has not had much to eat or drink due to decreased appetite. Endorses feeling short of breath when he takes a deep breath. Pt has no change from his normal routine the past few days, no recent illness or sick contacts. Denies F/C, vomiting, diarrhea, orthopnea, PND, CP, dysuria.    ED vitals: T98.2, H115, /81, R18, sat 99% on RA. Blood work significant for Hg 6.5 (baseline 9.8 in 2018), , SCr 22.2 (baseline 7.79 in 2018), pH 7.26, bicarb 12, AG 28. 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 He called his nephrologist, Dr. Middleton, with his symptoms and she told him to come to the ED for likely urgent HD. Pt reports decreased urine production starting 3 days ago. Also endorses generalized crampy abdominal pain with radiation to the back exacerbated by eating, severity 7/10. He feels like his abdomen and legs are heavier. He has not had much to eat or drink due to decreased appetite. Endorses feeling short of breath when he takes a deep breath. Pt has no change from his normal routine the past few days, no recent illness or sick contacts. Denies F/C, vomiting, diarrhea, orthopnea, PND, CP, dysuria.    ED vitals: T98.2, H115, /81, R18, sat 99% on RA. Blood work significant for Hg 6.5 (baseline 9.8 in 2018), , SCr 22.2 (baseline 7.79 in 2018), pH 7.26, bicarb 12, AG 28. Received 1u pRBC and zofran. Nephrology consulted in ED and pt to be seen in AM. NSR with QTc 534 on EKG. 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 He called his nephrologist, Dr. Middleton, with his symptoms and she told him to come to the ED for likely urgent HD. Pt reports decreased urine production starting 3 days ago. Also endorses generalized crampy abdominal pain with radiation to the back exacerbated by eating, severity /10. He feels like his abdomen and legs are heavier. He has not had much to eat or drink due to decreased appetite. Endorses feeling short of breath when he takes a deep breath. Endorses tingling of his hands. Pt has no change from his normal routine the past few days, no recent illness or sick contacts. Pt recently initiated care with nephrologist Dr. Middleton in 2018 transitioning from peds. She started him on calcium acetate and labetalol but he couldn't tolerate as it made him nauseated. Denies F/C, vomiting, diarrhea, orthopnea, PND, CP, dysuria.    ED vitals: T98.2, H115, /81, R18, sat 99% on RA. Blood work significant for Hg 6.5 (baseline 9.8 in 2018), , SCr 22.2 (baseline 7.79 in 2018), metabolic acidosis pH 7.26, bicarb 12, AG 28. Received 1u pRBC and zofran. Nephrology consulted in ED and pt to be seen in AM. NSR with QTc 534 on EKG. 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 He called his nephrologist, Dr. Middleton, with his symptoms and she told him to come to the ED for likely urgent HD. Pt reports decreased urine production starting 3 days ago. Also endorses generalized crampy abdominal pain with radiation to the back exacerbated by eating, severity 7/10. He feels like his abdomen and legs are heavier. He has not had much to eat or drink due to decreased appetite. Endorses feeling short of breath when he takes a deep breath. Endorses tingling of his hands. Pt has no change from his normal routine the past few days, no recent illness or sick contacts. Pt recently initiated care with nephrologist Dr. Middleton in 2018 transitioning from peds. She started him on calcium acetate and labetalol but he couldn't tolerate as it made him nauseated. Pt had plans to initiate HD next month, LUE AVF placed 2017.Denies F/C, vomiting, diarrhea, orthopnea, PND, CP, dysuria.    ED vitals: T98.2, H115, /81, R18, sat 99% on RA. Blood work significant for Hg 6.5 (baseline 9.8 in 2018), , SCr 22.2 (baseline 7.79 in 2018), metabolic acidosis pH 7.26, bicarb 12, AG 28. Received 1u pRBC and zofran. Nephrology consulted in ED and pt to be seen in AM. NSR with QTc 534 on EKG. 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 He called his nephrologist, Dr. Middleton, with his symptoms and she told him to come to the ED for likely urgent HD. Pt reports decreased urine production starting 3 days ago. Also endorses generalized crampy abdominal pain with radiation to the back exacerbated by eating, severity 7/10. He feels like his abdomen and legs are heavier. He has not had much to eat or drink due to decreased appetite. Endorses feeling short of breath when he takes a deep breath. Endorses tingling of his hands. Pt has no change from his normal routine the past few days, no recent illness or sick contacts. Pt recently initiated care with nephrologist Dr. Middleton in 2018 transitioning from peds. She started him on calcium acetate and labetalol but he couldn't tolerate as it made him nauseated. Pt had plans to initiate HD next month, TUANE AVF placed 2017.Denies F/C, vomiting, diarrhea, orthopnea, PND, CP, dysuria.    ED vitals: T98.2, H115, /81, R18, sat 99% on RA. Blood work significant for Hg 6.5 (baseline 9.8 in 2018), , SCr 22.2 (baseline 7.79 in 2018), Ca 5.3, phos 10, metabolic acidosis pH 7.26, bicarb 12, AG 28. Received 1u pRBC and zofran. Nephrology consulted in ED and pt to be seen in AM. NSR with QTc 534 on EKG. 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 He called his nephrologist, Dr. Middleton, with his symptoms and she told him to come to the ED for likely HD. Pt reports decreased urine production starting 3 days ago. Also endorses generalized crampy abdominal pain with radiation to the back exacerbated by eating, severity 7/10. The abdominal pain is more diffuse but greater in left quadrants. He feels like his abdomen and legs are heavier. He has not had much to eat or drink due to decreased appetite. Endorses feeling short of breath when he takes a deep breath but no chest pain or palpitation. Endorses tingling of his hands. Pt has no change from his normal routine the past few days, no recent illness or sick contacts. Pt recently initiated care with nephrologist Dr. Middleton in 2018 transitioning from peds. She started him on calcium acetate and labetalol but he couldn't tolerate as it made him nauseated. Pt had plans to initiate HD next month, LUE AVF placed 2017.Denies F/C, vomiting, diarrhea, orthopnea, PND, CP, dysuria.    ED vitals: T98.2, H115, /81, R18, sat 99% on RA. Blood work significant for Hg 6.5 (baseline 9.8 in 2018), , SCr 22.2 (baseline 7.79 in 2018), Ca 5.3, phos 10, metabolic acidosis pH 7.26, bicarb 12, AG 28. Received 1u pRBC and zofran. Nephrology consulted in ED and pt to be seen in AM. NSR with QTc 534 on EKG.

## 2018-09-07 NOTE — H&P ADULT - ATTENDING COMMENTS
Patient assigned to me by night hospitalist in charge for management and care for patient for this evening only. Care to be resumed by day hospitalist in the morning and thereafter.     Patient seen and examined at bedside. Agree with resident note above. Changes made to note above where appropriate. Case d/w nephrology fellow overnight who reports no need for immediate HD and ok to hold transplant meds until tacrolimus level is check and cellcept dosing clarified. Of note patient reports to me he takes these meds but the last time these meds were prescribed was some time ago per [employer]. Furthermore, also based on [employer] documentation there is concern for possible nonadherence to medical therapy. Will check CT abdomen to r/o RP bleed and recheck CBC to make sure patient is responding appropriately. Less likely this is overt hemolysis or TTP. Patient assigned to me by night hospitalist in charge for management and care for patient for this evening only (starting around 6:00AM). Care to be resumed by day hospitalist in the morning and thereafter.     Patient seen and examined at bedside. Agree with resident note above. Case presented to me by resident around 6:00AM. Changes made to note above where appropriate. Case d/w nephrology fellow overnight who reports no need for immediate HD and ok to hold transplant meds until tacrolimus level is checked and cellcept dosing clarified. Of note patient reports to me he takes these meds but the last time these meds were prescribed was some time ago per [employer]. Furthermore, also based on [employer] documentation there is concern for possible nonadherence to medical therapy. Will check CT abdomen to r/o RP bleed and recheck CBC to make sure patient is responding appropriately. Less likely this is overt hemolysis or TTP. There is significant hyperphosphatemia and hypocalcemia and in setting of QTc prolongation risk of arrhythmia. D/w nephrology fellow who agrees with supplementation of hypocalcemia and correction of hyperphosphatemia. If uncorrected despite repletion and HD, patient will need telemetry bed for monitoring for arrhythmias.

## 2018-09-07 NOTE — H&P ADULT - PROBLEM SELECTOR PLAN 8
Generalized abd pain associated with nausea and mild tenderness LLQ. LFTs wnl. Likely 2/2 edema/ascites versus internal bleeding  -check CT abdomen/pelvis to r/o intraabdominal pathology; no signs of acute abdomen at this point. Generalized abd pain associated with nausea and mild tenderness LLQ. LFTs wnl. Likely 2/2 edema/ascites versus internal bleeding; less likely ischemic colitis   -check CT abdomen/pelvis to r/o intraabdominal pathology; no signs of acute abdomen at this point.

## 2018-09-07 NOTE — H&P ADULT - NSHPSOCIALHISTORY_GEN_ALL_CORE
Lives with parents and siblings. Does not work or go to school. Denies tobacco, illicit drugs, etoh.

## 2018-09-07 NOTE — H&P ADULT - PROBLEM SELECTOR PLAN 7
DVT: hold pharmacologic ppx due to risk uremic bleed  Diet: renal replacement  Activity: ambulate as tolerated -Hypomagnesemia on chem panel; will replete given the prolonged QTc and risk for arrhythmia if uncorrected.

## 2018-09-07 NOTE — CONSULT NOTE ADULT - ASSESSMENT
18 year old male with h/o advanced CKD s/p DDRT in 2008 and chronic rejection admitted for worsening renal failure.

## 2018-09-08 LAB
CALCIUM SERPL-MCNC: 6.2 MG/DL — CRITICAL LOW (ref 8.4–10.5)
HBV SURFACE AB SER-ACNC: 3.5 MIU/ML — LOW
HBV SURFACE AG SER-ACNC: SIGNIFICANT CHANGE UP
HCV AB S/CO SERPL IA: 0.1 S/CO — SIGNIFICANT CHANGE UP
HCV AB SERPL-IMP: SIGNIFICANT CHANGE UP
TACROLIMUS SERPL-MCNC: <2 NG/ML — SIGNIFICANT CHANGE UP

## 2018-09-08 PROCEDURE — 99233 SBSQ HOSP IP/OBS HIGH 50: CPT

## 2018-09-08 PROCEDURE — 90935 HEMODIALYSIS ONE EVALUATION: CPT | Mod: GC

## 2018-09-08 RX ORDER — CALCIUM GLUCONATE 100 MG/ML
1 VIAL (ML) INTRAVENOUS ONCE
Qty: 0 | Refills: 0 | Status: COMPLETED | OUTPATIENT
Start: 2018-09-08 | End: 2018-09-08

## 2018-09-08 RX ADMIN — Medication 200 GRAM(S): at 06:21

## 2018-09-08 RX ADMIN — AMLODIPINE BESYLATE 10 MILLIGRAM(S): 2.5 TABLET ORAL at 06:23

## 2018-09-08 RX ADMIN — Medication 5 MILLIGRAM(S): at 06:23

## 2018-09-08 RX ADMIN — Medication 1 TABLET(S): at 12:51

## 2018-09-08 RX ADMIN — Medication 1950 MILLIGRAM(S): at 06:21

## 2018-09-08 RX ADMIN — Medication 325 MILLIGRAM(S): at 12:51

## 2018-09-08 RX ADMIN — SEVELAMER CARBONATE 1600 MILLIGRAM(S): 2400 POWDER, FOR SUSPENSION ORAL at 12:50

## 2018-09-08 RX ADMIN — Medication 1950 MILLIGRAM(S): at 17:55

## 2018-09-08 RX ADMIN — CALCITRIOL 0.25 MICROGRAM(S): 0.5 CAPSULE ORAL at 12:51

## 2018-09-08 RX ADMIN — SEVELAMER CARBONATE 1600 MILLIGRAM(S): 2400 POWDER, FOR SUSPENSION ORAL at 06:23

## 2018-09-08 RX ADMIN — SEVELAMER CARBONATE 1600 MILLIGRAM(S): 2400 POWDER, FOR SUSPENSION ORAL at 21:28

## 2018-09-08 NOTE — PROVIDER CONTACT NOTE (CRITICAL VALUE NOTIFICATION) - ACTION/TREATMENT ORDERED:
Repeat calcium at midnight. Cont to monitor.
Will supplement with calcium. Will continue to monitor.
Will repeat level in AM, it is trending down. Continue to monitor.

## 2018-09-09 LAB
ANION GAP SERPL CALC-SCNC: 22 MMOL/L — HIGH (ref 5–17)
BUN SERPL-MCNC: 68 MG/DL — HIGH (ref 7–23)
CALCIUM SERPL-MCNC: 6.7 MG/DL — LOW (ref 8.4–10.5)
CHLORIDE SERPL-SCNC: 96 MMOL/L — SIGNIFICANT CHANGE UP (ref 96–108)
CO2 SERPL-SCNC: 22 MMOL/L — SIGNIFICANT CHANGE UP (ref 22–31)
CREAT SERPL-MCNC: 13.33 MG/DL — HIGH (ref 0.5–1.3)
GLUCOSE SERPL-MCNC: 97 MG/DL — SIGNIFICANT CHANGE UP (ref 70–99)
HCT VFR BLD CALC: 25.8 % — LOW (ref 39–50)
HGB BLD-MCNC: 8.9 G/DL — LOW (ref 13–17)
MAGNESIUM SERPL-MCNC: 1.8 MG/DL — SIGNIFICANT CHANGE UP (ref 1.6–2.6)
MCHC RBC-ENTMCNC: 28.4 PG — SIGNIFICANT CHANGE UP (ref 27–34)
MCHC RBC-ENTMCNC: 34.5 GM/DL — SIGNIFICANT CHANGE UP (ref 32–36)
MCV RBC AUTO: 82.4 FL — SIGNIFICANT CHANGE UP (ref 80–100)
PLATELET # BLD AUTO: 155 K/UL — SIGNIFICANT CHANGE UP (ref 150–400)
POTASSIUM SERPL-MCNC: 3 MMOL/L — LOW (ref 3.5–5.3)
POTASSIUM SERPL-SCNC: 3 MMOL/L — LOW (ref 3.5–5.3)
RBC # BLD: 3.13 M/UL — LOW (ref 4.2–5.8)
RBC # FLD: 13 % — SIGNIFICANT CHANGE UP (ref 10.3–14.5)
SODIUM SERPL-SCNC: 140 MMOL/L — SIGNIFICANT CHANGE UP (ref 135–145)
TACROLIMUS SERPL-MCNC: <2 NG/ML — SIGNIFICANT CHANGE UP
WBC # BLD: 10.22 K/UL — SIGNIFICANT CHANGE UP (ref 3.8–10.5)
WBC # FLD AUTO: 10.22 K/UL — SIGNIFICANT CHANGE UP (ref 3.8–10.5)

## 2018-09-09 PROCEDURE — 99233 SBSQ HOSP IP/OBS HIGH 50: CPT

## 2018-09-09 PROCEDURE — 99232 SBSQ HOSP IP/OBS MODERATE 35: CPT

## 2018-09-09 RX ORDER — TACROLIMUS 5 MG/1
6 CAPSULE ORAL EVERY 12 HOURS
Qty: 0 | Refills: 0 | Status: DISCONTINUED | OUTPATIENT
Start: 2018-09-09 | End: 2018-09-15

## 2018-09-09 RX ORDER — ONDANSETRON 8 MG/1
4 TABLET, FILM COATED ORAL ONCE
Qty: 0 | Refills: 0 | Status: COMPLETED | OUTPATIENT
Start: 2018-09-09 | End: 2018-09-09

## 2018-09-09 RX ORDER — POTASSIUM CHLORIDE 20 MEQ
40 PACKET (EA) ORAL ONCE
Qty: 0 | Refills: 0 | Status: DISCONTINUED | OUTPATIENT
Start: 2018-09-09 | End: 2018-09-09

## 2018-09-09 RX ADMIN — SEVELAMER CARBONATE 1600 MILLIGRAM(S): 2400 POWDER, FOR SUSPENSION ORAL at 11:47

## 2018-09-09 RX ADMIN — SEVELAMER CARBONATE 1600 MILLIGRAM(S): 2400 POWDER, FOR SUSPENSION ORAL at 08:12

## 2018-09-09 RX ADMIN — SEVELAMER CARBONATE 1600 MILLIGRAM(S): 2400 POWDER, FOR SUSPENSION ORAL at 18:55

## 2018-09-09 RX ADMIN — Medication 1950 MILLIGRAM(S): at 05:43

## 2018-09-09 RX ADMIN — ONDANSETRON 4 MILLIGRAM(S): 8 TABLET, FILM COATED ORAL at 03:50

## 2018-09-09 RX ADMIN — Medication 1 TABLET(S): at 11:48

## 2018-09-09 RX ADMIN — CALCITRIOL 0.25 MICROGRAM(S): 0.5 CAPSULE ORAL at 11:48

## 2018-09-09 RX ADMIN — TACROLIMUS 6 MILLIGRAM(S): 5 CAPSULE ORAL at 18:58

## 2018-09-09 RX ADMIN — AMLODIPINE BESYLATE 10 MILLIGRAM(S): 2.5 TABLET ORAL at 05:43

## 2018-09-09 RX ADMIN — TACROLIMUS 6 MILLIGRAM(S): 5 CAPSULE ORAL at 11:55

## 2018-09-09 RX ADMIN — Medication 325 MILLIGRAM(S): at 11:48

## 2018-09-09 RX ADMIN — Medication 5 MILLIGRAM(S): at 05:43

## 2018-09-10 DIAGNOSIS — N18.6 END STAGE RENAL DISEASE: ICD-10-CM

## 2018-09-10 LAB
ANION GAP SERPL CALC-SCNC: 17 MMOL/L — SIGNIFICANT CHANGE UP (ref 5–17)
BUN SERPL-MCNC: 73 MG/DL — HIGH (ref 7–23)
CALCIUM SERPL-MCNC: 6.9 MG/DL — LOW (ref 8.4–10.5)
CHLORIDE SERPL-SCNC: 93 MMOL/L — LOW (ref 96–108)
CO2 SERPL-SCNC: 22 MMOL/L — SIGNIFICANT CHANGE UP (ref 22–31)
CREAT SERPL-MCNC: 14.63 MG/DL — HIGH (ref 0.5–1.3)
GLUCOSE SERPL-MCNC: 92 MG/DL — SIGNIFICANT CHANGE UP (ref 70–99)
HCT VFR BLD CALC: 25.6 % — LOW (ref 39–50)
HGB BLD-MCNC: 8.5 G/DL — LOW (ref 13–17)
MAGNESIUM SERPL-MCNC: 1.7 MG/DL — SIGNIFICANT CHANGE UP (ref 1.6–2.6)
MCHC RBC-ENTMCNC: 27.2 PG — SIGNIFICANT CHANGE UP (ref 27–34)
MCHC RBC-ENTMCNC: 33.2 GM/DL — SIGNIFICANT CHANGE UP (ref 32–36)
MCV RBC AUTO: 82.1 FL — SIGNIFICANT CHANGE UP (ref 80–100)
PLATELET # BLD AUTO: 172 K/UL — SIGNIFICANT CHANGE UP (ref 150–400)
POTASSIUM SERPL-MCNC: 3 MMOL/L — LOW (ref 3.5–5.3)
POTASSIUM SERPL-SCNC: 3 MMOL/L — LOW (ref 3.5–5.3)
RBC # BLD: 3.12 M/UL — LOW (ref 4.2–5.8)
RBC # FLD: 13 % — SIGNIFICANT CHANGE UP (ref 10.3–14.5)
SODIUM SERPL-SCNC: 132 MMOL/L — LOW (ref 135–145)
TACROLIMUS SERPL-MCNC: 3.1 NG/ML — SIGNIFICANT CHANGE UP
WBC # BLD: 9.28 K/UL — SIGNIFICANT CHANGE UP (ref 3.8–10.5)
WBC # FLD AUTO: 9.28 K/UL — SIGNIFICANT CHANGE UP (ref 3.8–10.5)

## 2018-09-10 PROCEDURE — 99233 SBSQ HOSP IP/OBS HIGH 50: CPT

## 2018-09-10 PROCEDURE — 90935 HEMODIALYSIS ONE EVALUATION: CPT | Mod: GC

## 2018-09-10 RX ORDER — CALCIUM ACETATE 667 MG
1334 TABLET ORAL
Qty: 0 | Refills: 0 | Status: DISCONTINUED | OUTPATIENT
Start: 2018-09-10 | End: 2018-09-10

## 2018-09-10 RX ORDER — ONDANSETRON 8 MG/1
4 TABLET, FILM COATED ORAL ONCE
Qty: 0 | Refills: 0 | Status: COMPLETED | OUTPATIENT
Start: 2018-09-10 | End: 2018-09-10

## 2018-09-10 RX ADMIN — AMLODIPINE BESYLATE 10 MILLIGRAM(S): 2.5 TABLET ORAL at 13:02

## 2018-09-10 RX ADMIN — Medication 5 MILLIGRAM(S): at 05:42

## 2018-09-10 RX ADMIN — Medication 325 MILLIGRAM(S): at 13:02

## 2018-09-10 RX ADMIN — SEVELAMER CARBONATE 1600 MILLIGRAM(S): 2400 POWDER, FOR SUSPENSION ORAL at 17:14

## 2018-09-10 RX ADMIN — TACROLIMUS 6 MILLIGRAM(S): 5 CAPSULE ORAL at 05:42

## 2018-09-10 RX ADMIN — TACROLIMUS 6 MILLIGRAM(S): 5 CAPSULE ORAL at 17:14

## 2018-09-10 RX ADMIN — SEVELAMER CARBONATE 1600 MILLIGRAM(S): 2400 POWDER, FOR SUSPENSION ORAL at 05:42

## 2018-09-10 RX ADMIN — SEVELAMER CARBONATE 1600 MILLIGRAM(S): 2400 POWDER, FOR SUSPENSION ORAL at 13:02

## 2018-09-10 RX ADMIN — ONDANSETRON 4 MILLIGRAM(S): 8 TABLET, FILM COATED ORAL at 07:46

## 2018-09-10 RX ADMIN — Medication 1 TABLET(S): at 13:02

## 2018-09-11 LAB
ANION GAP SERPL CALC-SCNC: 17 MMOL/L — SIGNIFICANT CHANGE UP (ref 5–17)
BKV DNA SPEC QL NAA+PROBE: SIGNIFICANT CHANGE UP
BUN SERPL-MCNC: 38 MG/DL — HIGH (ref 7–23)
CALCIUM SERPL-MCNC: 7.3 MG/DL — LOW (ref 8.4–10.5)
CHLORIDE SERPL-SCNC: 99 MMOL/L — SIGNIFICANT CHANGE UP (ref 96–108)
CO2 SERPL-SCNC: 24 MMOL/L — SIGNIFICANT CHANGE UP (ref 22–31)
CREAT SERPL-MCNC: 8.81 MG/DL — HIGH (ref 0.5–1.3)
GLUCOSE SERPL-MCNC: 97 MG/DL — SIGNIFICANT CHANGE UP (ref 70–99)
HCT VFR BLD CALC: 25.1 % — LOW (ref 39–50)
HGB BLD-MCNC: 8.4 G/DL — LOW (ref 13–17)
MAGNESIUM SERPL-MCNC: 1.9 MG/DL — SIGNIFICANT CHANGE UP (ref 1.6–2.6)
MCHC RBC-ENTMCNC: 28 PG — SIGNIFICANT CHANGE UP (ref 27–34)
MCHC RBC-ENTMCNC: 33.5 GM/DL — SIGNIFICANT CHANGE UP (ref 32–36)
MCV RBC AUTO: 83.7 FL — SIGNIFICANT CHANGE UP (ref 80–100)
PHOSPHATE SERPL-MCNC: 4.9 MG/DL — HIGH (ref 2.5–4.5)
PLATELET # BLD AUTO: 180 K/UL — SIGNIFICANT CHANGE UP (ref 150–400)
POTASSIUM SERPL-MCNC: 3.2 MMOL/L — LOW (ref 3.5–5.3)
POTASSIUM SERPL-SCNC: 3.2 MMOL/L — LOW (ref 3.5–5.3)
RBC # BLD: 3 M/UL — LOW (ref 4.2–5.8)
RBC # FLD: 12.6 % — SIGNIFICANT CHANGE UP (ref 10.3–14.5)
SODIUM SERPL-SCNC: 140 MMOL/L — SIGNIFICANT CHANGE UP (ref 135–145)
TACROLIMUS SERPL-MCNC: 2.7 NG/ML — SIGNIFICANT CHANGE UP
WBC # BLD: 10.4 K/UL — SIGNIFICANT CHANGE UP (ref 3.8–10.5)
WBC # FLD AUTO: 10.4 K/UL — SIGNIFICANT CHANGE UP (ref 3.8–10.5)

## 2018-09-11 PROCEDURE — 99233 SBSQ HOSP IP/OBS HIGH 50: CPT

## 2018-09-11 RX ADMIN — TACROLIMUS 6 MILLIGRAM(S): 5 CAPSULE ORAL at 06:33

## 2018-09-11 RX ADMIN — Medication 5 MILLIGRAM(S): at 06:33

## 2018-09-11 RX ADMIN — TACROLIMUS 6 MILLIGRAM(S): 5 CAPSULE ORAL at 17:09

## 2018-09-11 RX ADMIN — Medication 325 MILLIGRAM(S): at 12:12

## 2018-09-11 RX ADMIN — SEVELAMER CARBONATE 1600 MILLIGRAM(S): 2400 POWDER, FOR SUSPENSION ORAL at 08:43

## 2018-09-11 RX ADMIN — SEVELAMER CARBONATE 1600 MILLIGRAM(S): 2400 POWDER, FOR SUSPENSION ORAL at 17:09

## 2018-09-11 RX ADMIN — AMLODIPINE BESYLATE 10 MILLIGRAM(S): 2.5 TABLET ORAL at 06:33

## 2018-09-11 RX ADMIN — SEVELAMER CARBONATE 1600 MILLIGRAM(S): 2400 POWDER, FOR SUSPENSION ORAL at 12:12

## 2018-09-11 RX ADMIN — Medication 1 TABLET(S): at 12:12

## 2018-09-11 NOTE — PROVIDER CONTACT NOTE (OTHER) - SITUATION
IV fell out while pt in the shower. pt is not getting and IV medications, does patient need IV access?

## 2018-09-12 LAB
ANION GAP SERPL CALC-SCNC: 19 MMOL/L — HIGH (ref 5–17)
BUN SERPL-MCNC: 46 MG/DL — HIGH (ref 7–23)
CALCIUM SERPL-MCNC: 7.4 MG/DL — LOW (ref 8.4–10.5)
CHLORIDE SERPL-SCNC: 98 MMOL/L — SIGNIFICANT CHANGE UP (ref 96–108)
CO2 SERPL-SCNC: 22 MMOL/L — SIGNIFICANT CHANGE UP (ref 22–31)
CREAT SERPL-MCNC: 10.32 MG/DL — HIGH (ref 0.5–1.3)
GLUCOSE SERPL-MCNC: 94 MG/DL — SIGNIFICANT CHANGE UP (ref 70–99)
HCT VFR BLD CALC: 23.9 % — LOW (ref 39–50)
HGB BLD-MCNC: 8.1 G/DL — LOW (ref 13–17)
MAGNESIUM SERPL-MCNC: 1.7 MG/DL — SIGNIFICANT CHANGE UP (ref 1.6–2.6)
MCHC RBC-ENTMCNC: 28.4 PG — SIGNIFICANT CHANGE UP (ref 27–34)
MCHC RBC-ENTMCNC: 33.9 GM/DL — SIGNIFICANT CHANGE UP (ref 32–36)
MCV RBC AUTO: 83.9 FL — SIGNIFICANT CHANGE UP (ref 80–100)
PHOSPHATE SERPL-MCNC: 5.1 MG/DL — HIGH (ref 2.5–4.5)
PLATELET # BLD AUTO: 193 K/UL — SIGNIFICANT CHANGE UP (ref 150–400)
POTASSIUM SERPL-MCNC: 3.2 MMOL/L — LOW (ref 3.5–5.3)
POTASSIUM SERPL-SCNC: 3.2 MMOL/L — LOW (ref 3.5–5.3)
RBC # BLD: 2.85 M/UL — LOW (ref 4.2–5.8)
RBC # FLD: 12.6 % — SIGNIFICANT CHANGE UP (ref 10.3–14.5)
SODIUM SERPL-SCNC: 139 MMOL/L — SIGNIFICANT CHANGE UP (ref 135–145)
TACROLIMUS SERPL-MCNC: 3.3 NG/ML — SIGNIFICANT CHANGE UP
WBC # BLD: 10.92 K/UL — HIGH (ref 3.8–10.5)
WBC # FLD AUTO: 10.92 K/UL — HIGH (ref 3.8–10.5)

## 2018-09-12 PROCEDURE — 90935 HEMODIALYSIS ONE EVALUATION: CPT | Mod: GC

## 2018-09-12 PROCEDURE — 99233 SBSQ HOSP IP/OBS HIGH 50: CPT

## 2018-09-12 RX ORDER — ERYTHROPOIETIN 10000 [IU]/ML
8000 INJECTION, SOLUTION INTRAVENOUS; SUBCUTANEOUS
Qty: 0 | Refills: 0 | Status: DISCONTINUED | OUTPATIENT
Start: 2018-09-12 | End: 2018-09-15

## 2018-09-12 RX ADMIN — SEVELAMER CARBONATE 1600 MILLIGRAM(S): 2400 POWDER, FOR SUSPENSION ORAL at 17:21

## 2018-09-12 RX ADMIN — TACROLIMUS 6 MILLIGRAM(S): 5 CAPSULE ORAL at 17:21

## 2018-09-12 RX ADMIN — Medication 1 TABLET(S): at 13:02

## 2018-09-12 RX ADMIN — AMLODIPINE BESYLATE 10 MILLIGRAM(S): 2.5 TABLET ORAL at 06:04

## 2018-09-12 RX ADMIN — SEVELAMER CARBONATE 1600 MILLIGRAM(S): 2400 POWDER, FOR SUSPENSION ORAL at 13:02

## 2018-09-12 RX ADMIN — SEVELAMER CARBONATE 1600 MILLIGRAM(S): 2400 POWDER, FOR SUSPENSION ORAL at 08:32

## 2018-09-12 RX ADMIN — TACROLIMUS 6 MILLIGRAM(S): 5 CAPSULE ORAL at 06:04

## 2018-09-12 RX ADMIN — Medication 5 MILLIGRAM(S): at 06:04

## 2018-09-12 RX ADMIN — Medication 325 MILLIGRAM(S): at 13:02

## 2018-09-13 LAB
ANION GAP SERPL CALC-SCNC: 14 MMOL/L — SIGNIFICANT CHANGE UP (ref 5–17)
BLD GP AB SCN SERPL QL: NEGATIVE — SIGNIFICANT CHANGE UP
BUN SERPL-MCNC: 24 MG/DL — HIGH (ref 7–23)
CALCIUM SERPL-MCNC: 7.9 MG/DL — LOW (ref 8.4–10.5)
CHLORIDE SERPL-SCNC: 100 MMOL/L — SIGNIFICANT CHANGE UP (ref 96–108)
CO2 SERPL-SCNC: 26 MMOL/L — SIGNIFICANT CHANGE UP (ref 22–31)
CREAT SERPL-MCNC: 7.54 MG/DL — HIGH (ref 0.5–1.3)
GLUCOSE SERPL-MCNC: 89 MG/DL — SIGNIFICANT CHANGE UP (ref 70–99)
HCT VFR BLD CALC: 23.3 % — LOW (ref 39–50)
HGB BLD-MCNC: 7.5 G/DL — LOW (ref 13–17)
IRON SATN MFR SERPL: 15 % — LOW (ref 16–55)
IRON SATN MFR SERPL: 33 UG/DL — LOW (ref 45–165)
MAGNESIUM SERPL-MCNC: 1.8 MG/DL — SIGNIFICANT CHANGE UP (ref 1.6–2.6)
MCHC RBC-ENTMCNC: 26.7 PG — LOW (ref 27–34)
MCHC RBC-ENTMCNC: 32.2 GM/DL — SIGNIFICANT CHANGE UP (ref 32–36)
MCV RBC AUTO: 82.9 FL — SIGNIFICANT CHANGE UP (ref 80–100)
PHOSPHATE SERPL-MCNC: 4.9 MG/DL — HIGH (ref 2.5–4.5)
PLATELET # BLD AUTO: 203 K/UL — SIGNIFICANT CHANGE UP (ref 150–400)
POTASSIUM SERPL-MCNC: 3.7 MMOL/L — SIGNIFICANT CHANGE UP (ref 3.5–5.3)
POTASSIUM SERPL-SCNC: 3.7 MMOL/L — SIGNIFICANT CHANGE UP (ref 3.5–5.3)
RBC # BLD: 2.81 M/UL — LOW (ref 4.2–5.8)
RBC # FLD: 12.9 % — SIGNIFICANT CHANGE UP (ref 10.3–14.5)
RH IG SCN BLD-IMP: POSITIVE — SIGNIFICANT CHANGE UP
SODIUM SERPL-SCNC: 140 MMOL/L — SIGNIFICANT CHANGE UP (ref 135–145)
TACROLIMUS SERPL-MCNC: 4.4 NG/ML — SIGNIFICANT CHANGE UP
TIBC SERPL-MCNC: 221 UG/DL — SIGNIFICANT CHANGE UP (ref 220–430)
UIBC SERPL-MCNC: 188 UG/DL — SIGNIFICANT CHANGE UP (ref 110–370)
WBC # BLD: 10.92 K/UL — HIGH (ref 3.8–10.5)
WBC # FLD AUTO: 10.92 K/UL — HIGH (ref 3.8–10.5)

## 2018-09-13 PROCEDURE — 99233 SBSQ HOSP IP/OBS HIGH 50: CPT

## 2018-09-13 RX ADMIN — TACROLIMUS 6 MILLIGRAM(S): 5 CAPSULE ORAL at 06:09

## 2018-09-13 RX ADMIN — Medication 325 MILLIGRAM(S): at 12:49

## 2018-09-13 RX ADMIN — SEVELAMER CARBONATE 1600 MILLIGRAM(S): 2400 POWDER, FOR SUSPENSION ORAL at 12:48

## 2018-09-13 RX ADMIN — SEVELAMER CARBONATE 1600 MILLIGRAM(S): 2400 POWDER, FOR SUSPENSION ORAL at 17:48

## 2018-09-13 RX ADMIN — Medication 5 MILLIGRAM(S): at 06:09

## 2018-09-13 RX ADMIN — SEVELAMER CARBONATE 1600 MILLIGRAM(S): 2400 POWDER, FOR SUSPENSION ORAL at 12:49

## 2018-09-13 RX ADMIN — Medication 1 TABLET(S): at 12:49

## 2018-09-13 RX ADMIN — AMLODIPINE BESYLATE 10 MILLIGRAM(S): 2.5 TABLET ORAL at 06:09

## 2018-09-13 RX ADMIN — TACROLIMUS 6 MILLIGRAM(S): 5 CAPSULE ORAL at 17:48

## 2018-09-13 NOTE — DIETITIAN INITIAL EVALUATION ADULT. - PROBLEM SELECTOR PLAN 2
-Unclear if patient is adherent to therapy given last prescribed medication for his tacrolimus and mycophenolate was several years ago (see [employer]). Check tacrolimus level and renal to clarify appropriate dosing. Overnight renal fellow agrees.

## 2018-09-13 NOTE — DIETITIAN INITIAL EVALUATION ADULT. - PROBLEM SELECTOR PLAN 7
-Hypomagnesemia on chem panel; will replete given the prolonged QTc and risk for arrhythmia if uncorrected.

## 2018-09-13 NOTE — DIETITIAN INITIAL EVALUATION ADULT. - ADHERENCE
n/a Pt with PMH of ESRD S/P DDRT (2008), now with CKD stage V and started on HD this admission. Pt with minimal knowledge of recommendations regarding diet on HD but amenable to education. Denied micronutrient supplementation PTA. Reports NKFA/n/a

## 2018-09-13 NOTE — DIETITIAN INITIAL EVALUATION ADULT. - PROBLEM SELECTOR PLAN 3
Likely 2/2 CKD. Presents with acute drop in Hg with , pt is at risk for uremic bleed. He has dark BM but supposedly on iron. His last BM was over 24-hr ago and formed, making this less likely to be overt upper GI bleeeding. He has no hematochezia either. Unclear if this abdominal pain is internal bleeding or not. Will repeat CBC to see if there is appropriate response and will check CT abdomen/pelvis noncontrast to r/o RP bleed. No skin changes to support RP bleed.   -maintain Hg >7  -s/p 1u pRBC, will give 2nd unit  -continue iron tab  -f/u iron studies (though s/p 1u pRBC), check hemolysis labs (less likely); less likely TTP despite small schistocytes on automated diff.

## 2018-09-13 NOTE — DIETITIAN INITIAL EVALUATION ADULT. - NUTRITION INTERVENTION
Meals and Snack/Collaboration and Referral of Nutrition Care Collaboration and Referral of Nutrition Care/Nutrition Education

## 2018-09-13 NOTE — DIETITIAN INITIAL EVALUATION ADULT. - NS AS NUTRI INTERV MEALS SNACK
1) Recommend Low Sodium, No Concentrated Phosphorous Diet. Potassium restriction not indicated at this time given repeatedly low levels S/P repletion 2) Provide food preferences within dietary restrictions when feasible 3) Encourage continued good PO intake of meals

## 2018-09-13 NOTE — DIETITIAN INITIAL EVALUATION ADULT. - ENERGY NEEDS
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 Admitted with abdominal pain, malaise, N/V 2/2 ARF & started on HD

## 2018-09-13 NOTE — DIETITIAN INITIAL EVALUATION ADULT. - PROBLEM SELECTOR PLAN 6
-Corrected calcium is about 5.2 mg/dL. Will replete cautiously and recheck vitamin D, PTH, and calcium. Renal fellow agrees.

## 2018-09-13 NOTE — DIETITIAN INITIAL EVALUATION ADULT. - NS FNS REASON FOR WEIGHT CHANG
Reports UBW of 170 pounds, states weight fluctuates up/down 5 pounds regularly. Noted weights ranging from 171-180.9 pounds during this admission. Most recent pre-HD weight 171.7 pounds & post-.9 pounds/fluid loss/fluid retention

## 2018-09-13 NOTE — DIETITIAN INITIAL EVALUATION ADULT. - ETIOLOGY
increased demand for nutrient 2/2 catabolic process limited previous exposure to nutrition related recommendations

## 2018-09-13 NOTE — DIETITIAN INITIAL EVALUATION ADULT. - OTHER INFO
Patient seen for length of stay on [address] [place]. Patient seen for length of stay on [address] [place]. Reports much improved appetite & PO intakes during this admission. Reports 100% PO intake of breakfast this morning. Denies chewing/swallowing difficulty. Denies nausea/emesis currently. Reports last BM 9/12

## 2018-09-13 NOTE — DIETITIAN INITIAL EVALUATION ADULT. - ORAL INTAKE PTA
poor/Pt endorsed poor/Pt endorsed a poor appetite & PO intakes PTA 2/2 nausea/vomiting & lethargy. Normally has a good appetite & usual diet recall as follows: breakfast- cereal with milk, fruit lunch- sandwich, soup dinner- meat, potatoes/rice/beans, green vegetables. States mother does the food shopping & meal preparation. Meals are usually prepared with minimal salt & low Na [place]. Diet recall reveals some food selections high in potassium/phosphorous/sodium

## 2018-09-13 NOTE — DIETITIAN INITIAL EVALUATION ADULT. - PROBLEM SELECTOR PLAN 1
Presenting with ARF leading to metabolic acidosis pH 7.26, AG 28, , SCr 22 (baseline 7.8 in 5/2018), phos 10, Ca 5.3 in kidney transplant recipient. Pt was planning for initiating HD through LUE AVF (placed 12/2017). ARF likely progression of chronic kidney transplant rejection. Low suspicion for infectious etiology such as HIV or BK virus nephropathy. Possibly element of nephrotoxicity 2/2 tacrolimus. No sign hematuria, low suspicion for vasculitis.  -nephrology consulted, to see in AM  -Initiation of HD inpatient  -bruit present of LUE AVF, left arm precautions  -continue oral bicarb and calcium supplement, vitamin D. Start phos binder  -monitor on telemetry  -f/u BK virus level, intact PTH & vitamin D level  -Renally dose all meds and monitor electrolytes carefully.

## 2018-09-13 NOTE — DIETITIAN INITIAL EVALUATION ADULT. - PROBLEM SELECTOR PLAN 10
DVT: hold pharmacologic ppx due to risk uremic bleed  Diet: renal replacement  Activity: ambulate as tolerated

## 2018-09-13 NOTE — DIETITIAN INITIAL EVALUATION ADULT. - NS AS NUTRI INTERV ED CONTENT3
Provided in-depth diet education regarding ESRD on HD. Education included importance of monitoring intake of foods high in potassium/phosphorous/sodium, review of foods high in these micronutrients as well as alternatives, monitoring fluid intake in between HD sessions, label reading, and importance of adequate protein intake due to increased demand. Encouraged patient to increase intake of good sources of high biological value protein and reviewed dietary sources. Encouraged pt to follow-up with RD at HD center after d/c home for ongoing recommendations based upon lab values & dietary intakes. Provided Hemodialysis Booklet.

## 2018-09-13 NOTE — DIETITIAN INITIAL EVALUATION ADULT. - PROBLEM SELECTOR PLAN 5
-Patient has significant hyperphosphatemia. Would start phosphate binders and if it does not correct today by HD, patient will need telemetry bed.

## 2018-09-13 NOTE — DIETITIAN INITIAL EVALUATION ADULT. - PROBLEM SELECTOR PLAN 8
Generalized abd pain associated with nausea and mild tenderness LLQ. LFTs wnl. Likely 2/2 edema/ascites versus internal bleeding; less likely ischemic colitis   -check CT abdomen/pelvis to r/o intraabdominal pathology; no signs of acute abdomen at this point.

## 2018-09-14 ENCOUNTER — TRANSCRIPTION ENCOUNTER (OUTPATIENT)
Age: 18
End: 2018-09-14

## 2018-09-14 LAB
ANION GAP SERPL CALC-SCNC: 17 MMOL/L — SIGNIFICANT CHANGE UP (ref 5–17)
BUN SERPL-MCNC: 43 MG/DL — HIGH (ref 7–23)
CALCIUM SERPL-MCNC: 7.9 MG/DL — LOW (ref 8.4–10.5)
CHLORIDE SERPL-SCNC: 101 MMOL/L — SIGNIFICANT CHANGE UP (ref 96–108)
CO2 SERPL-SCNC: 22 MMOL/L — SIGNIFICANT CHANGE UP (ref 22–31)
CREAT SERPL-MCNC: 9.27 MG/DL — HIGH (ref 0.5–1.3)
GLUCOSE SERPL-MCNC: 87 MG/DL — SIGNIFICANT CHANGE UP (ref 70–99)
HCT VFR BLD CALC: 22.5 % — LOW (ref 39–50)
HCT VFR BLD CALC: 28.9 % — LOW (ref 39–50)
HGB BLD-MCNC: 10.3 G/DL — LOW (ref 13–17)
HGB BLD-MCNC: 7.7 G/DL — LOW (ref 13–17)
MCHC RBC-ENTMCNC: 28.1 PG — SIGNIFICANT CHANGE UP (ref 27–34)
MCHC RBC-ENTMCNC: 30 PG — SIGNIFICANT CHANGE UP (ref 27–34)
MCHC RBC-ENTMCNC: 34.2 GM/DL — SIGNIFICANT CHANGE UP (ref 32–36)
MCHC RBC-ENTMCNC: 35.6 GM/DL — SIGNIFICANT CHANGE UP (ref 32–36)
MCV RBC AUTO: 82.1 FL — SIGNIFICANT CHANGE UP (ref 80–100)
MCV RBC AUTO: 84.2 FL — SIGNIFICANT CHANGE UP (ref 80–100)
PLATELET # BLD AUTO: 202 K/UL — SIGNIFICANT CHANGE UP (ref 150–400)
PLATELET # BLD AUTO: 233 K/UL — SIGNIFICANT CHANGE UP (ref 150–400)
POTASSIUM SERPL-MCNC: 4.2 MMOL/L — SIGNIFICANT CHANGE UP (ref 3.5–5.3)
POTASSIUM SERPL-SCNC: 4.2 MMOL/L — SIGNIFICANT CHANGE UP (ref 3.5–5.3)
RBC # BLD: 2.74 M/UL — LOW (ref 4.2–5.8)
RBC # BLD: 3.43 M/UL — LOW (ref 4.2–5.8)
RBC # FLD: 12.1 % — SIGNIFICANT CHANGE UP (ref 10.3–14.5)
RBC # FLD: 12.8 % — SIGNIFICANT CHANGE UP (ref 10.3–14.5)
SODIUM SERPL-SCNC: 140 MMOL/L — SIGNIFICANT CHANGE UP (ref 135–145)
TACROLIMUS SERPL-MCNC: 4.6 NG/ML — SIGNIFICANT CHANGE UP
WBC # BLD: 8.32 K/UL — SIGNIFICANT CHANGE UP (ref 3.8–10.5)
WBC # BLD: 9.8 K/UL — SIGNIFICANT CHANGE UP (ref 3.8–10.5)
WBC # FLD AUTO: 8.32 K/UL — SIGNIFICANT CHANGE UP (ref 3.8–10.5)
WBC # FLD AUTO: 9.8 K/UL — SIGNIFICANT CHANGE UP (ref 3.8–10.5)

## 2018-09-14 PROCEDURE — 99233 SBSQ HOSP IP/OBS HIGH 50: CPT

## 2018-09-14 PROCEDURE — 90935 HEMODIALYSIS ONE EVALUATION: CPT | Mod: GC

## 2018-09-14 RX ADMIN — SEVELAMER CARBONATE 1600 MILLIGRAM(S): 2400 POWDER, FOR SUSPENSION ORAL at 07:51

## 2018-09-14 RX ADMIN — Medication 325 MILLIGRAM(S): at 13:59

## 2018-09-14 RX ADMIN — TACROLIMUS 6 MILLIGRAM(S): 5 CAPSULE ORAL at 06:02

## 2018-09-14 RX ADMIN — SEVELAMER CARBONATE 1600 MILLIGRAM(S): 2400 POWDER, FOR SUSPENSION ORAL at 13:57

## 2018-09-14 RX ADMIN — ERYTHROPOIETIN 8000 UNIT(S): 10000 INJECTION, SOLUTION INTRAVENOUS; SUBCUTANEOUS at 12:01

## 2018-09-14 RX ADMIN — Medication 1 TABLET(S): at 13:58

## 2018-09-14 RX ADMIN — SEVELAMER CARBONATE 1600 MILLIGRAM(S): 2400 POWDER, FOR SUSPENSION ORAL at 17:39

## 2018-09-14 RX ADMIN — AMLODIPINE BESYLATE 10 MILLIGRAM(S): 2.5 TABLET ORAL at 13:58

## 2018-09-14 RX ADMIN — TACROLIMUS 6 MILLIGRAM(S): 5 CAPSULE ORAL at 17:39

## 2018-09-14 RX ADMIN — Medication 5 MILLIGRAM(S): at 06:02

## 2018-09-14 NOTE — DISCHARGE NOTE ADULT - HOSPITAL COURSE
This is a 18 yr old M who presents on 9/6 to [facility] ED with acute renal failure s/p failed kidney transplant.  The pt. has acute renal failure superimposed on stage 5 chronic kidney disease, not on chronic dialysis, has fistula in place on left forearm and is tolerating HD with renal MD following. The patient was planned for out patient dialysis set up by  with plan for d/c Saturday 9/15 after dialysis. For anemia in chronic kidney disease CKD stage 5 pt. is   s/p 1 unit prbc on 9/14 with s/p CBC post transfusion stable.  As above s/p kidney transplant rejection currently on prednisone 5 with plan as per nephrology to discontinue Cellcept and Continue Tacrolimus and followup as an out-pt.  For hyperphosphatemia continued with Renagel for hypomagnesemia with repletion  as needed; for ypocalcemia - labs reflecting uptrending s/p IV CA. Pt. was started on calcitriol and calcium carbonate and CA+ bath with dialysis. During admission prolonged QT interval improved on repeat 510 monitoring. As per renal for metabolic acidemia pt. continued with sodium bicarb tabs. On Saturday 9/15 day of d/c pt. received dialysis in preparation for out-pt dialysis.

## 2018-09-14 NOTE — DISCHARGE NOTE ADULT - MEDICATION SUMMARY - MEDICATIONS TO TAKE
I will START or STAY ON the medications listed below when I get home from the hospital:    predniSONE 5 mg oral tablet  -- 1 tab(s) by mouth once a day  -- Indication: For renal     amLODIPine 10 mg oral tablet  -- 1 tab(s) by mouth once a day  -- Indication: For Heart     epoetin charlie  -- 8000 unit(s) intravenous 3 times a week  IVP 3x/ week on dialysis day Tu / Th / Sat  -- Indication: For Anemia     tacrolimus  -- 6 milligram(s) by mouth 2 times a day  -- Indication: For renal     ferrous sulfate 325 mg (65 mg elemental iron) oral tablet  -- 1 tab(s) by mouth once a day  -- Indication: For supplement     sevelamer hydrochloride 800 mg oral tablet  -- 2 tab(s) by mouth 3 times a day  -- Indication: For renal     calcium-vitamin D 500 mg-200 intl units oral tablet  -- 1 tab(s) by mouth once a day  -- Indication: For supplement I will START or STAY ON the medications listed below when I get home from the hospital:    predniSONE 5 mg oral tablet  -- 1 tab(s) by mouth once a day  -- Indication: For renal     amLODIPine 10 mg oral tablet  -- 1 tab(s) by mouth once a day  -- Indication: For Heart     epoetin charlie  -- 8000 unit(s) intravenous 3 times a week  IVP 3x/ week on dialysis day Tu / Th / Sat  -- Indication: For Anemia     tacrolimus  -- 6 milligram(s) by mouth 2 times a day  -- Indication: For renal     ferrous sulfate 325 mg (65 mg elemental iron) oral tablet  -- 1 tab(s) by mouth once a day  -- Indication: For supplement     sevelamer hydrochloride 800 mg oral tablet  -- 2 tab(s) by mouth 3 times a day  -- Indication: For renal     calcium-vitamin D 500 mg-200 intl units oral tablet  -- 1 tab(s) by mouth once a day  -- Indication: For supplement     ergocalciferol 50,000 intl units (1.25 mg) oral capsule  -- 1 cap(s) by mouth once a week MDD:1x / week for 8 weeks  - 8 doses  -- Indication: For supplement

## 2018-09-14 NOTE — DISCHARGE NOTE ADULT - COMMUNITY RESOURCES
[facility], [address] [place] [address] Rd, [place] ([phone]) Tu/Th/Sa 9:30am starting 9/18

## 2018-09-14 NOTE — PROGRESS NOTE ADULT - ATTENDING COMMENTS
ESRD now requiring HD, failed transplant  Tolerating 2nd treatment  UF1kg  Hep free  AVF functioning  Next hd monday  Outpt HD needed closer to [place]
Patient seen and examined while on HD- tolerating well, without complaints
Patient seen while on HD. Feeling well.  Will get HD again tomorrow as he is going to be [facility].

## 2018-09-14 NOTE — DISCHARGE NOTE ADULT - MEDICATION SUMMARY - MEDICATIONS TO CHANGE
I will SWITCH the dose or number of times a day I take the medications listed below when I get home from the hospital:    Calcium 600+D oral tablet  -- 1 tab(s) by mouth once a day

## 2018-09-14 NOTE — DISCHARGE NOTE ADULT - PLAN OF CARE
s/p failed transplant  / seen and followed by HD / + AVF left forearm / tolerating start of HD Follow as an out-pt in [facility]  You have an appointment ON triston Follow as an out-pt in [facility]  You have an appointment on Tuesday 9/18 at the center   Please take your medications as prescribed s/p labs trended and stable  / /s/p PRBC x 1 unit on 9/14 Followup with dialysis as above s/p HD started Continue with Prednisone  - 5mg daily  Continue with Tacrolimus as prescribed  - f/w renal s/p labs trended Continue with Renagel s/p labs trended / s/p repletion Followup with renal MD resolving As above

## 2018-09-14 NOTE — DISCHARGE NOTE ADULT - ADDITIONAL INSTRUCTIONS
Take your medications as directed '  Follow up with your dialysis center in [facility], [address] [place] [address] [place] ([phone]) Tu/Th/Sa 9:30am starting 9/18 Take your medications as directed   Follow up with [facility], [address] [place] [address] [place], [place] ([phone]) Tu/Th/Sa 9:30am starting 9/18

## 2018-09-14 NOTE — DISCHARGE NOTE ADULT - CARE PROVIDERS DIRECT ADDRESSES
[email or web address]

## 2018-09-14 NOTE — DISCHARGE NOTE ADULT - SECONDARY DIAGNOSIS.
Anemia History of transplantation, renal Hyperphosphatemia Hypomagnesemia Hypocalcemia Metabolic acidemia

## 2018-09-14 NOTE — DISCHARGE NOTE ADULT - MEDICATION SUMMARY - MEDICATIONS TO STOP TAKING
I will STOP taking the medications listed below when I get home from the hospital:    calcitriol 0.25 mcg oral capsule  -- 1 cap(s) by mouth once a day    sodium bicarbonate 650 mg oral tablet  -- 3 tab(s) by mouth 2 times a day (with meals)    mycophenolate mofetil 200 mg/mL oral suspension  -- 2 milliliter(s) by mouth 2 times a day

## 2018-09-14 NOTE — DISCHARGE NOTE ADULT - PATIENT PORTAL LINK FT
You can access the [email or web address] Patient Portal, offered by [facility], by registering with the following website: [email or web address]

## 2018-09-14 NOTE — DISCHARGE NOTE ADULT - CARE PROVIDER_API CALL
Ar Mcdowell (MD), Internal Medicine; Nephrology  [facility] [place] [address]  Phone: [phone]  Fax: [phone]

## 2018-09-15 VITALS
WEIGHT: 166.45 LBS | HEART RATE: 97 BPM | RESPIRATION RATE: 18 BRPM | TEMPERATURE: 99 F | DIASTOLIC BLOOD PRESSURE: 77 MMHG | OXYGEN SATURATION: 100 % | SYSTOLIC BLOOD PRESSURE: 130 MMHG

## 2018-09-15 LAB
ANION GAP SERPL CALC-SCNC: 14 MMOL/L — SIGNIFICANT CHANGE UP (ref 5–17)
BUN SERPL-MCNC: 37 MG/DL — HIGH (ref 7–23)
CALCIUM SERPL-MCNC: 8.6 MG/DL — SIGNIFICANT CHANGE UP (ref 8.4–10.5)
CHLORIDE SERPL-SCNC: 97 MMOL/L — SIGNIFICANT CHANGE UP (ref 96–108)
CO2 SERPL-SCNC: 25 MMOL/L — SIGNIFICANT CHANGE UP (ref 22–31)
CREAT SERPL-MCNC: 7.76 MG/DL — HIGH (ref 0.5–1.3)
GLUCOSE SERPL-MCNC: 97 MG/DL — SIGNIFICANT CHANGE UP (ref 70–99)
HCT VFR BLD CALC: 28.1 % — LOW (ref 39–50)
HCT VFR BLD CALC: 28.2 % — LOW (ref 39–50)
HGB BLD-MCNC: 9.7 G/DL — LOW (ref 13–17)
HGB BLD-MCNC: 9.8 G/DL — LOW (ref 13–17)
MCHC RBC-ENTMCNC: 29 PG — SIGNIFICANT CHANGE UP (ref 27–34)
MCHC RBC-ENTMCNC: 29 PG — SIGNIFICANT CHANGE UP (ref 27–34)
MCHC RBC-ENTMCNC: 34.5 GM/DL — SIGNIFICANT CHANGE UP (ref 32–36)
MCHC RBC-ENTMCNC: 34.7 GM/DL — SIGNIFICANT CHANGE UP (ref 32–36)
MCV RBC AUTO: 83.4 FL — SIGNIFICANT CHANGE UP (ref 80–100)
MCV RBC AUTO: 84.1 FL — SIGNIFICANT CHANGE UP (ref 80–100)
PLATELET # BLD AUTO: 220 K/UL — SIGNIFICANT CHANGE UP (ref 150–400)
PLATELET # BLD AUTO: 224 K/UL — SIGNIFICANT CHANGE UP (ref 150–400)
POTASSIUM SERPL-MCNC: 3.7 MMOL/L — SIGNIFICANT CHANGE UP (ref 3.5–5.3)
POTASSIUM SERPL-SCNC: 3.7 MMOL/L — SIGNIFICANT CHANGE UP (ref 3.5–5.3)
RBC # BLD: 3.36 M/UL — LOW (ref 4.2–5.8)
RBC # BLD: 3.37 M/UL — LOW (ref 4.2–5.8)
RBC # FLD: 11.8 % — SIGNIFICANT CHANGE UP (ref 10.3–14.5)
RBC # FLD: 11.8 % — SIGNIFICANT CHANGE UP (ref 10.3–14.5)
SODIUM SERPL-SCNC: 136 MMOL/L — SIGNIFICANT CHANGE UP (ref 135–145)
TACROLIMUS SERPL-MCNC: 15.1 NG/ML — SIGNIFICANT CHANGE UP
TACROLIMUS SERPL-MCNC: 5.8 NG/ML — SIGNIFICANT CHANGE UP
WBC # BLD: 10.6 K/UL — HIGH (ref 3.8–10.5)
WBC # BLD: 10.9 K/UL — HIGH (ref 3.8–10.5)
WBC # FLD AUTO: 10.6 K/UL — HIGH (ref 3.8–10.5)
WBC # FLD AUTO: 10.9 K/UL — HIGH (ref 3.8–10.5)

## 2018-09-15 PROCEDURE — 81001 URINALYSIS AUTO W/SCOPE: CPT

## 2018-09-15 PROCEDURE — 85045 AUTOMATED RETICULOCYTE COUNT: CPT

## 2018-09-15 PROCEDURE — 82947 ASSAY GLUCOSE BLOOD QUANT: CPT

## 2018-09-15 PROCEDURE — 80076 HEPATIC FUNCTION PANEL: CPT

## 2018-09-15 PROCEDURE — 82435 ASSAY OF BLOOD CHLORIDE: CPT

## 2018-09-15 PROCEDURE — 90935 HEMODIALYSIS ONE EVALUATION: CPT

## 2018-09-15 PROCEDURE — 85014 HEMATOCRIT: CPT

## 2018-09-15 PROCEDURE — 86923 COMPATIBILITY TEST ELECTRIC: CPT

## 2018-09-15 PROCEDURE — 86850 RBC ANTIBODY SCREEN: CPT

## 2018-09-15 PROCEDURE — P9016: CPT

## 2018-09-15 PROCEDURE — 99285 EMERGENCY DEPT VISIT HI MDM: CPT | Mod: 25

## 2018-09-15 PROCEDURE — 84295 ASSAY OF SERUM SODIUM: CPT

## 2018-09-15 PROCEDURE — 82728 ASSAY OF FERRITIN: CPT

## 2018-09-15 PROCEDURE — 86901 BLOOD TYPING SEROLOGIC RH(D): CPT

## 2018-09-15 PROCEDURE — 83540 ASSAY OF IRON: CPT

## 2018-09-15 PROCEDURE — 83735 ASSAY OF MAGNESIUM: CPT

## 2018-09-15 PROCEDURE — 82306 VITAMIN D 25 HYDROXY: CPT

## 2018-09-15 PROCEDURE — 86900 BLOOD TYPING SEROLOGIC ABO: CPT

## 2018-09-15 PROCEDURE — 83010 ASSAY OF HAPTOGLOBIN QUANT: CPT

## 2018-09-15 PROCEDURE — 83605 ASSAY OF LACTIC ACID: CPT

## 2018-09-15 PROCEDURE — 84132 ASSAY OF SERUM POTASSIUM: CPT

## 2018-09-15 PROCEDURE — 84100 ASSAY OF PHOSPHORUS: CPT

## 2018-09-15 PROCEDURE — 83550 IRON BINDING TEST: CPT

## 2018-09-15 PROCEDURE — 99239 HOSP IP/OBS DSCHRG MGMT >30: CPT

## 2018-09-15 PROCEDURE — 36415 COLL VENOUS BLD VENIPUNCTURE: CPT

## 2018-09-15 PROCEDURE — 80048 BASIC METABOLIC PNL TOTAL CA: CPT

## 2018-09-15 PROCEDURE — 71046 X-RAY EXAM CHEST 2 VIEWS: CPT

## 2018-09-15 PROCEDURE — 83615 LACTATE (LD) (LDH) ENZYME: CPT

## 2018-09-15 PROCEDURE — 80197 ASSAY OF TACROLIMUS: CPT

## 2018-09-15 PROCEDURE — 99261: CPT

## 2018-09-15 PROCEDURE — 85027 COMPLETE CBC AUTOMATED: CPT

## 2018-09-15 PROCEDURE — 82310 ASSAY OF CALCIUM: CPT

## 2018-09-15 PROCEDURE — 82803 BLOOD GASES ANY COMBINATION: CPT

## 2018-09-15 PROCEDURE — 82330 ASSAY OF CALCIUM: CPT

## 2018-09-15 PROCEDURE — 87340 HEPATITIS B SURFACE AG IA: CPT

## 2018-09-15 PROCEDURE — 36430 TRANSFUSION BLD/BLD COMPNT: CPT

## 2018-09-15 PROCEDURE — 93005 ELECTROCARDIOGRAM TRACING: CPT

## 2018-09-15 PROCEDURE — 86706 HEP B SURFACE ANTIBODY: CPT

## 2018-09-15 PROCEDURE — 83690 ASSAY OF LIPASE: CPT

## 2018-09-15 PROCEDURE — 87799 DETECT AGENT NOS DNA QUANT: CPT

## 2018-09-15 PROCEDURE — 83970 ASSAY OF PARATHORMONE: CPT

## 2018-09-15 PROCEDURE — 86803 HEPATITIS C AB TEST: CPT

## 2018-09-15 PROCEDURE — 74176 CT ABD & PELVIS W/O CONTRAST: CPT

## 2018-09-15 PROCEDURE — 80074 ACUTE HEPATITIS PANEL: CPT

## 2018-09-15 RX ORDER — ERGOCALCIFEROL 1.25 MG/1
1 CAPSULE ORAL
Qty: 8 | Refills: 0 | OUTPATIENT
Start: 2018-09-15 | End: 2018-11-09

## 2018-09-15 RX ORDER — ERYTHROPOIETIN 10000 [IU]/ML
8000 INJECTION, SOLUTION INTRAVENOUS; SUBCUTANEOUS
Qty: 0 | Refills: 0 | COMMUNITY
Start: 2018-09-15

## 2018-09-15 RX ORDER — SODIUM BICARBONATE 1 MEQ/ML
3 SYRINGE (ML) INTRAVENOUS
Qty: 0 | Refills: 0 | COMMUNITY

## 2018-09-15 RX ORDER — CALCITRIOL 0.5 UG/1
1 CAPSULE ORAL
Qty: 0 | Refills: 0 | COMMUNITY

## 2018-09-15 RX ORDER — SEVELAMER CARBONATE 2400 MG/1
2 POWDER, FOR SUSPENSION ORAL
Qty: 180 | Refills: 0 | OUTPATIENT
Start: 2018-09-15 | End: 2018-10-14

## 2018-09-15 RX ORDER — ERGOCALCIFEROL 1.25 MG/1
50000 CAPSULE ORAL
Qty: 0 | Refills: 0 | Status: DISCONTINUED | OUTPATIENT
Start: 2018-09-15 | End: 2018-09-15

## 2018-09-15 RX ORDER — MYCOPHENOLATE MOFETIL 250 MG/1
2 CAPSULE ORAL
Qty: 0 | Refills: 0 | COMMUNITY

## 2018-09-15 RX ADMIN — Medication 1 TABLET(S): at 12:39

## 2018-09-15 RX ADMIN — Medication 5 MILLIGRAM(S): at 05:56

## 2018-09-15 RX ADMIN — TACROLIMUS 6 MILLIGRAM(S): 5 CAPSULE ORAL at 05:56

## 2018-09-15 RX ADMIN — SEVELAMER CARBONATE 1600 MILLIGRAM(S): 2400 POWDER, FOR SUSPENSION ORAL at 12:39

## 2018-09-15 RX ADMIN — AMLODIPINE BESYLATE 10 MILLIGRAM(S): 2.5 TABLET ORAL at 13:18

## 2018-09-15 RX ADMIN — Medication 325 MILLIGRAM(S): at 12:39

## 2018-09-15 NOTE — PROGRESS NOTE ADULT - PROBLEM SELECTOR PLAN 7
- Improved on repeat 510 continue to monitor
- Improved on repeat 510 continue to monitor   - Daily EKG's
- Improved on repeat 510 continue to monitor   - Daily EKG's
Improved on repeat 510 continue to monitor   daily EKG's
Improved on repeat 510 continue to monitor   daily EKG's

## 2018-09-15 NOTE — PROGRESS NOTE ADULT - ASSESSMENT
18 year old male with h/o advanced CKD s/p DDRT in 2008 and chronic rejection admitted for worsening renal failure.
18 yr old M presents w/
18 year old male with h/o advanced CKD s/p DDRT in 2008 and chronic rejection admitted for worsening renal failure.
18 yr old M presents w/
18 yr old M presents with Acute renal failure s/p failed kidney transplant.

## 2018-09-15 NOTE — PROGRESS NOTE ADULT - PROBLEM SELECTOR PLAN 3
Patient with anemia in setting of ESRD. Hb noted to be below goal. Serum ferritin done on 9/7 was 353. Recommend to check TIBC and serum iron as well to calculate Tsat. If Tsat is below 30, then will need to start patient on IV iron during HD. Will start on epogen 8000 U TIW during HD. Monitor Hb.
- Currently on prednisone 5.  - As per nephrology, to discontinue Cellcept and Continue Tacrolimus.
Currently on prednisone 5.  Will discuss with nephrology regarding restarting Cellcept and Tacrolimus.
Patient with anemia in setting of ESRD. Hb noted to be below goal. Serum ferritin done on 9/7 was 353.  will need to start patient on IV iron during HD - ok to have done as outpatient. C/w epogen 8000 U TIW during HD. Monitor Hb.
Patient with anemia in setting of ESRD. Hb noted to be below goal. Serum ferritin done on 9/7 was 353. Recommend to check TIBC and serum iron as well to calculate Tsat. If Tsat is below 30, then will need to start patient on IV iron during HD.
Patient with anemia in setting of ESRD. Hb noted to be below goal. Serum ferritin done on 9/7 was 353. Recommend to check TIBC and serum iron as well to calculate Tsat. If Tsat is below 30, then will need to start patient on IV iron during HD. C/w epogen 8000 U TIW during HD. Monitor Hb.
hold immunosuppressants  Will continue prednisone 5 for now.
- Currently on prednisone 5.  - As per nephrology, to discontinue Cellcept and Continue Tacrolimus.
- Currently on prednisone 5.  - As per nephrology, to discontinue Cellcept and Continue Tacrolimus.

## 2018-09-15 NOTE — PROGRESS NOTE ADULT - PROBLEM SELECTOR PROBLEM 4
Hypocalcemia
Hypocalcemia
Hyperphosphatemia
Hypocalcemia
Hyperphosphatemia
Hyperphosphatemia

## 2018-09-15 NOTE — PROGRESS NOTE ADULT - PROBLEM SELECTOR PROBLEM 1
Acute renal failure superimposed on stage 5 chronic kidney disease, not on chronic dialysis, unspecified acute renal failure type
ESRD on hemodialysis
Acute renal failure superimposed on stage 5 chronic kidney disease, not on chronic dialysis, unspecified acute renal failure type
ESRD needing dialysis
ESRD on hemodialysis
Acute renal failure superimposed on stage 5 chronic kidney disease, not on chronic dialysis, unspecified acute renal failure type
Acute renal failure superimposed on stage 5 chronic kidney disease, not on chronic dialysis, unspecified acute renal failure type

## 2018-09-15 NOTE — PROGRESS NOTE ADULT - REASON FOR ADMISSION
malaise, nausea
uremia
malaise, nausea

## 2018-09-15 NOTE — PROGRESS NOTE ADULT - PROBLEM SELECTOR PROBLEM 7
Prolonged QT interval

## 2018-09-15 NOTE — PROGRESS NOTE ADULT - PROBLEM SELECTOR PLAN 4
- Continue with Renagel.
Serum calcium noted to be low but improving. Will continue to dialyze with high calcium bath for now. Monitor serum calcium level.
- Continue with Renagel.
Serum calcium noted to be low but improving. Will continue to dialyze with high calcium bath for now. Monitor serum calcium level.
Started phosphate binders.  To initiate dialysis
Started phosphate binders.  To initiate dialysis
- Continue with Renagel.

## 2018-09-15 NOTE — PROGRESS NOTE ADULT - PROVIDER SPECIALTY LIST ADULT
Hospitalist
Nephrology
Hospitalist
Hospitalist
Nephrology

## 2018-09-15 NOTE — PROGRESS NOTE ADULT - PROBLEM SELECTOR PROBLEM 3
Anemia
Anemia
Kidney transplant rejection
Anemia
Kidney transplant rejection

## 2018-09-15 NOTE — PROGRESS NOTE ADULT - PROBLEM SELECTOR PROBLEM 5
Hypomagnesemia
Hyperphosphatemia
Hypomagnesemia

## 2018-09-15 NOTE — PROGRESS NOTE ADULT - PROBLEM SELECTOR PROBLEM 2
Hypertension
Anemia in chronic kidney disease, unspecified CKD stage
Hypertension
Anemia in chronic kidney disease, unspecified CKD stage
Hypertension
Anemia in chronic kidney disease, unspecified CKD stage
Anemia in chronic kidney disease, unspecified CKD stage

## 2018-09-15 NOTE — PROGRESS NOTE ADULT - PROBLEM SELECTOR PLAN 1
- Fistula in place on left forearm.    - Tolerated HD Friday.  - Renal following.
Patient with ESRD on HD in setting of failed renal transplant. Patient seen during HD today and tolerating it well. AVF working well with good blood flow. Next HD planned for Friday (9/14). Monitor BMP daily
- Fistula in place on left forearm.    - Tolerated HD Friday.  - Renal following.  Patient needs out patient  dialysis set up
- Fistula in place on left forearm.    - Tolerating HD.   - Renal following.  D/C planning after dialysis today.   Time spent coordinating plan 42 minutes.
- Fistula in place on left forearm.    - Tolerating HD.   - Renal following.  Patient needs out patient  dialysis set up
- Fistula in place on left forearm.    - Tolerating HD.   - Renal following.  Patient needs out patient  dialysis set up- d/c planning on Saturday after dialysis.
Fistula in place on left forearm.    Patient to initiate dialysis today   F/u renal recs
Fistula in place on left forearm.    Tolerated HD yesterday.  Renal following.
Patient with ESRD on HD in setting of failed renal transplant. Patient seen during HD today and tolerating it well. AVF working well with good blood flow. Patient had outpatient HD center with TTS schedule. Ok to discharge today
Patient with ESRD on HD in setting of failed renal transplant. Patient seen during HD today and tolerating it well. AVF working well with good blood flow. Patient had outpatient HD center with TTS schedule. Therefore would arrange for HD tomorrow as well to switch to TTS schedule. Monitor BMP daily
Patient with ESRD on HD in setting of failed renal transplant. Patient seen during third session of HD today and tolerating it well. AVF working well with good blood flow. Next HD planned for Wednesday(9/12). Recommend  evaluation for outpatient HD center close to [place] where patient lives. Monitor BMP.
- Fistula in place on left forearm.    - Tolerating HD.   - Renal following.  Patient needs out patient  dialysis set up- d/c planning on Saturday after dialysis.
- Fistula in place on left forearm.    - Tolerating HD.   - Renal following.  Patient needs out patient  dialysis set up

## 2018-09-15 NOTE — PROGRESS NOTE ADULT - PROBLEM SELECTOR PROBLEM 8
Metabolic acidemia

## 2018-09-15 NOTE — PROGRESS NOTE ADULT - PROBLEM SELECTOR PLAN 5
- Replete as needed.
Serum phosphorus noted to be elevated but at goal. Continue with renvela 1600 mg TID with meals. Monitor serum phosphorus level.
- Replete as needed.
Replete 2 mg IV
Replete as needed.
Serum phosphorus noted to be elevated but at goal. Continue with renvela 1600 mg TID with meals. Monitor serum phosphorus level.
Serum phosphorus noted to be elevated but at goal. Continue with renvela 1600 mg TID with meals. Monitor serum phosphorus level.  Vitamin D is low - start ergocalciferol 50,000 units weekly for 8 weeks.
Serum phosphorus noted to be elevated on labs done on 9/7. However no labs available since then. Would check serum phosphorus. Continue with renvela for now ; if serum phosphorus remains elevated, then will need to start on calcium acetate as well. Monitor serum phosphorus levels.
- Replete as needed.

## 2018-09-15 NOTE — PROGRESS NOTE ADULT - PROBLEM SELECTOR PLAN 8
- Continue sodium bicarb tabs
Continue sodium bicarb tabs
Continue sodium bicarb tabs

## 2018-09-15 NOTE — PROGRESS NOTE ADULT - PROBLEM SELECTOR PLAN 2
- s/p 1 unit prbc during admission.  - f/u iron studies.  - Secondary to chronic kidney disease.
BP noted to be acceptable during HD today. Continue with current antihypertensives. Monitor BP.
- s/p 1 unit prbc during admission.  - Secondary to chronic kidney disease.  CBC stable.
- s/p 1 unit prbc during admission.  - Secondary to chronic kidney disease.  CBC stable.
- s/p 1 unit prbc during admission.  - Secondary to chronic kidney disease.  Monitor CBC.
- s/p 1 unit prbc during admission.  - f/u iron studies.  - Secondary to chronic kidney disease.
BP noted to be acceptable during HD today. Continue with current antihypertensives. Monitor BP.
s/p 1 unit prbc f/u iron studies.
s/p 1 unit prbc f/u iron studies.  Secondary to chronic kidney disease.  Follow todays CBC.
- s/p 1 unit prbc during admission.  - Secondary to chronic kidney disease.  CBC stable.
- s/p 1 unit prbc during admission.  - Secondary to chronic kidney disease.  CBC stable.

## 2018-09-15 NOTE — PROGRESS NOTE ADULT - PROBLEM SELECTOR PROBLEM 6
Hypocalcemia
History of transplantation, renal
Hypocalcemia

## 2018-09-15 NOTE — PROGRESS NOTE ADULT - PROBLEM SELECTOR PLAN 6
- Uptrending  - S/p IV CA   - Started calcitriol and calcium carbonate   - CA+ bath with dialysis
Continue with current immunosuppressive medications for now. Will need to gradually taper off as outpatient.
- Uptrending  - S/p IV CA   - Started calcitriol and calcium carbonate   - CA+ bath with dialysis
Continue with current immunosuppressive medications for now. Will need to gradually taper off as outpatient.
S/p IV CA   Started calcitriol and calcium carbonate   CA+ bath with dialysis
S/p IV CA   Started calcitriol and calcium carbonate   CA+ bath with dialysis
- Uptrending  - S/p IV CA   - Started calcitriol and calcium carbonate   - CA+ bath with dialysis

## 2018-09-17 DIAGNOSIS — D89.9 DISORDER INVOLVING THE IMMUNE MECHANISM, UNSPECIFIED: ICD-10-CM

## 2018-09-17 DIAGNOSIS — Z71.89 OTHER SPECIFIED COUNSELING: ICD-10-CM

## 2018-09-17 DIAGNOSIS — N18.5 CHRONIC KIDNEY DISEASE, STAGE 5: ICD-10-CM

## 2018-09-17 DIAGNOSIS — I10 ESSENTIAL (PRIMARY) HYPERTENSION: ICD-10-CM

## 2018-09-18 ENCOUNTER — APPOINTMENT (OUTPATIENT)
Dept: NEPHROLOGY | Facility: CLINIC | Age: 18
End: 2018-09-18

## 2018-09-19 ENCOUNTER — INBOUND DOCUMENT (OUTPATIENT)
Age: 18
End: 2018-09-19

## 2018-09-20 ENCOUNTER — INBOUND DOCUMENT (OUTPATIENT)
Age: 18
End: 2018-09-20

## 2018-10-05 ENCOUNTER — RX RENEWAL (OUTPATIENT)
Age: 18
End: 2018-10-05

## 2018-10-08 ENCOUNTER — RX RENEWAL (OUTPATIENT)
Age: 18
End: 2018-10-08

## 2018-10-10 ENCOUNTER — RX RENEWAL (OUTPATIENT)
Age: 18
End: 2018-10-10

## 2018-11-01 ENCOUNTER — APPOINTMENT (OUTPATIENT)
Dept: INTERNAL MEDICINE | Facility: CLINIC | Age: 18
End: 2018-11-01

## 2018-12-05 ENCOUNTER — RX RENEWAL (OUTPATIENT)
Age: 18
End: 2018-12-05

## 2020-03-17 PROBLEM — D64.9 ANEMIA, UNSPECIFIED: Chronic | Status: ACTIVE | Noted: 2018-09-07

## 2020-03-17 PROBLEM — I10 ESSENTIAL (PRIMARY) HYPERTENSION: Chronic | Status: ACTIVE | Noted: 2018-09-07

## 2020-03-17 PROBLEM — N26.1 ATROPHY OF KIDNEY (TERMINAL): Chronic | Status: ACTIVE | Noted: 2018-09-06

## 2020-04-01 PROCEDURE — G9001: CPT

## 2020-08-14 ENCOUNTER — APPOINTMENT (OUTPATIENT)
Dept: INTERNAL MEDICINE | Facility: CLINIC | Age: 20
End: 2020-08-14

## 2020-11-23 NOTE — DIETITIAN INITIAL EVALUATION ADULT. - 35 TO
Daughter states patient is inpatient at [facility] due to COVID. Daughter will call to schedule the fasting lab (Lipid) once discharge.   [id number]

## 2021-01-11 ENCOUNTER — APPOINTMENT (OUTPATIENT)
Dept: NEPHROLOGY | Facility: CLINIC | Age: 21
End: 2021-01-11
Payer: MEDICAID

## 2021-01-11 VITALS
HEIGHT: 65.35 IN | OXYGEN SATURATION: 95 % | WEIGHT: 198 LBS | DIASTOLIC BLOOD PRESSURE: 80 MMHG | BODY MASS INDEX: 32.59 KG/M2 | HEART RATE: 77 BPM | TEMPERATURE: 98 F | SYSTOLIC BLOOD PRESSURE: 126 MMHG

## 2021-01-11 PROCEDURE — 99072 ADDL SUPL MATRL&STAF TM PHE: CPT

## 2021-01-11 PROCEDURE — 99203 OFFICE O/P NEW LOW 30 MIN: CPT

## 2021-01-11 RX ORDER — CALCITRIOL 0.25 UG/1
0.25 CAPSULE, LIQUID FILLED ORAL
Qty: 30 | Refills: 0 | Status: DISCONTINUED | COMMUNITY
Start: 2018-05-24 | End: 2021-01-11

## 2021-01-11 RX ORDER — CALCIUM ACETATE 667 MG/1
667 CAPSULE ORAL
Qty: 90 | Refills: 3 | Status: DISCONTINUED | COMMUNITY
Start: 2018-05-25 | End: 2021-01-11

## 2021-01-11 RX ORDER — ADHESIVE TAPE 3"X 2.3 YD
50 MCG TAPE, NON-MEDICATED TOPICAL
Qty: 90 | Refills: 2 | Status: DISCONTINUED | COMMUNITY
Start: 2018-05-25 | End: 2021-01-11

## 2021-01-11 RX ORDER — LABETALOL HYDROCHLORIDE 100 MG/1
100 TABLET, FILM COATED ORAL
Qty: 60 | Refills: 3 | Status: DISCONTINUED | COMMUNITY
Start: 2018-05-25 | End: 2021-01-11

## 2021-01-11 NOTE — ASSESSMENT
[FreeTextEntry1] : 1.  Renal transplant - pt s/p second DDRT both at [facility].  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  Will obtain records from [facility] and labs.  Will see if eventually IVIg can be given here, if needed. \par 2.  Immunosuppression - Pt currently on tacrolimus and prednisone.  Will repeat labs here at next visit in 1 month. \par 3.  HTN - well controlled. \par 4.  Health maintenance - Discussed Covid vaccination when available and safety precautions during pandemic.  \par \par Will bring pt back for f/u in 1 month.

## 2021-01-11 NOTE — HISTORY OF PRESENT ILLNESS
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  First kidney failed in .  Pt went on dialysis for about 1 year then received a DDRT 2019 at [facility].  Pt follows with Dr. Archer.  Has been doing well.  pt has labs, tacro was 4.8 and creatinine was 0.74mg/dl.  Pt may have had Covid when pandemic started.  Pt denies diabetes, has HTN.  Pt is currently on prograf and prednisone.  MMF was held due to BK virus.  He is receiving IVIg monthly at [facility].  Lives in [place].  \par \par For his first transplant he received thymoglobulin induction and had been maintained on prograf, cellcept and prednisone. He underwent a kidney biopsy in  and another one in  which revealed moderate IFTA/vascular changes consistent with chronic rejection versus hypertensive changes. \par \par Lives with parents, single.  No smoking or alcohol abuse.  Currently working in a school as a .

## 2021-01-11 NOTE — PHYSICAL EXAM
[General Appearance - Alert] : alert [General Appearance - In No Acute Distress] : in no acute distress [General Appearance - Well Nourished] : well nourished [Sclera] : the sclera and conjunctiva were normal [PERRL With Normal Accommodation] : pupils were equal in size, round, and reactive to light [Extraocular Movements] : extraocular movements were intact [Outer Ear] : the ears and nose were normal in appearance [Nasal Cavity] : the nasal mucosa and septum were normal [Neck Appearance] : the appearance of the neck was normal [Neck Cervical Mass (___cm)] : no neck mass was observed [Jugular Venous Distention Increased] : there was no jugular-venous distention [Respiration, Rhythm And Depth] : normal respiratory rhythm and effort [Exaggerated Use Of Accessory Muscles For Inspiration] : no accessory muscle use [Auscultation Breath Sounds / Voice Sounds] : lungs were clear to auscultation bilaterally [Heart Rate And Rhythm] : heart rate was normal and rhythm regular [Heart Sounds] : normal S1 and S2 [Heart Sounds Gallop] : no gallops [Edema] : there was no peripheral edema [Bowel Sounds] : normal bowel sounds [Abdomen Tenderness] : non-tender [Abdomen Soft] : soft [] : no hepato-splenomegaly [Cervical Lymph Nodes Enlarged Posterior Bilaterally] : posterior cervical [Cervical Lymph Nodes Enlarged Anterior Bilaterally] : anterior cervical [No CVA Tenderness] : no ~M costovertebral angle tenderness [Abnormal Walk] : normal gait [Nail Clubbing] : no clubbing  or cyanosis of the fingernails [Musculoskeletal - Swelling] : no joint swelling seen [Cranial Nerves] : cranial nerves 2-12 were intact [No Focal Deficits] : no focal deficits [Oriented To Time, Place, And Person] : oriented to person, place, and time [Impaired Insight] : insight and judgment were intact [Affect] : the affect was normal [FreeTextEntry1] : right lower quadrent and llq transplant scars.  Kidney palpable, non-tender

## 2021-02-16 ENCOUNTER — APPOINTMENT (OUTPATIENT)
Dept: NEPHROLOGY | Facility: CLINIC | Age: 21
End: 2021-02-16
Payer: MEDICAID

## 2021-02-16 ENCOUNTER — LABORATORY RESULT (OUTPATIENT)
Age: 21
End: 2021-02-16

## 2021-02-16 VITALS
TEMPERATURE: 97.2 F | OXYGEN SATURATION: 99 % | DIASTOLIC BLOOD PRESSURE: 76 MMHG | HEIGHT: 65.35 IN | HEART RATE: 78 BPM | RESPIRATION RATE: 14 BRPM | BODY MASS INDEX: 33.25 KG/M2 | WEIGHT: 202 LBS | SYSTOLIC BLOOD PRESSURE: 109 MMHG

## 2021-02-16 PROCEDURE — 99214 OFFICE O/P EST MOD 30 MIN: CPT

## 2021-02-16 PROCEDURE — 99072 ADDL SUPL MATRL&STAF TM PHE: CPT

## 2021-02-16 NOTE — ASSESSMENT
[FreeTextEntry1] : 1.  Renal transplant - pt s/p second DDRT both at [facility].  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  BK has been very low - will check today.  Will plan IVIg here.  Given for DSA chronic [facility] risk.  \par 2.  Immunosuppression - Pt currently on tacrolimus and prednisone.  Will repeat labs here today as tacro trough was high at 9.   \par 3.  HTN - well controlled. \par 4.  Health maintenance - Discussed Covid vaccination when available and safety precautions during pandemic.  \par \par Will bring pt back for f/u in 1-2 months. \par check tacro, CMP, A1c, vitamin D, UA and BK virus.  \par Arrange IVIg to be infused at [facility].

## 2021-02-16 NOTE — PHYSICAL EXAM
[General Appearance - Alert] : alert [General Appearance - In No Acute Distress] : in no acute distress [General Appearance - Well Nourished] : well nourished [Sclera] : the sclera and conjunctiva were normal [PERRL With Normal Accommodation] : pupils were equal in size, round, and reactive to light [Extraocular Movements] : extraocular movements were intact [Outer Ear] : the ears and nose were normal in appearance [Nasal Cavity] : the nasal mucosa and septum were normal [Neck Appearance] : the appearance of the neck was normal [Neck Cervical Mass (___cm)] : no neck mass was observed [Jugular Venous Distention Increased] : there was no jugular-venous distention [Respiration, Rhythm And Depth] : normal respiratory rhythm and effort [Exaggerated Use Of Accessory Muscles For Inspiration] : no accessory muscle use [Auscultation Breath Sounds / Voice Sounds] : lungs were clear to auscultation bilaterally [Heart Rate And Rhythm] : heart rate was normal and rhythm regular [Heart Sounds] : normal S1 and S2 [Heart Sounds Gallop] : no gallops [Edema] : there was no peripheral edema [Bowel Sounds] : normal bowel sounds [Abdomen Soft] : soft [Abdomen Tenderness] : non-tender [] : no hepato-splenomegaly [Cervical Lymph Nodes Enlarged Posterior Bilaterally] : posterior cervical [Cervical Lymph Nodes Enlarged Anterior Bilaterally] : anterior cervical [No CVA Tenderness] : no ~M costovertebral angle tenderness [Abnormal Walk] : normal gait [Nail Clubbing] : no clubbing  or cyanosis of the fingernails [Musculoskeletal - Swelling] : no joint swelling seen [Cranial Nerves] : cranial nerves 2-12 were intact [No Focal Deficits] : no focal deficits [Oriented To Time, Place, And Person] : oriented to person, place, and time [Impaired Insight] : insight and judgment were intact [Affect] : the affect was normal [FreeTextEntry1] : right lower quadrent and llq transplant scars.  Kidney palpable, non-tender

## 2021-02-16 NOTE — HISTORY OF PRESENT ILLNESS
[ Donor] :  donor [TextBox_7] : 6/2029 [de-identified] : Pt received labs and IVIg on Jan 21st 2021.  CBC normal, tacrolimus trough 9.2, creatinine 0.9, few RBC's in urine.  EBV pcr not detected, no BK virus checked.  Pt denies any gross hematuria.  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  First kidney failed in .  Pt went on dialysis for about 1 year then received a DDRT 2019 at [facility].  Early biopsies revealed some PTC and imflammation, he was given thymoglobulin and plasma exchange.  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  Pt follows with Dr. Archer.  Has been doing well.  pt has labs, tacro was 4.8 and creatinine was 0.74mg/dl.  Pt may have had Covid when pandemic started.  Pt denies diabetes, has HTN.  Pt is currently on prograf and prednisone.  MMF was held due to BK virus.  BK peaked at almost 50,000 but trended down below 5000 recently.  He is receiving IVIg monthly at [facility].  Lives in [place].  \par \par For his first transplant he received thymoglobulin induction and had been maintained on prograf, cellcept and prednisone. He underwent a kidney biopsy in  and another one in  which revealed moderate IFTA/vascular changes consistent with chronic rejection versus hypertensive changes.  \par \par Lives with parents, single.  No smoking or alcohol abuse.  Currently working in a school as a .

## 2021-02-22 DIAGNOSIS — T86.11 KIDNEY TRANSPLANT REJECTION: ICD-10-CM

## 2021-03-01 LAB
25(OH)D3 SERPL-MCNC: 22.8 NG/ML
ALBUMIN SERPL ELPH-MCNC: 4.7 G/DL
ALP BLD-CCNC: 125 U/L
ALT SERPL-CCNC: 21 U/L
ANION GAP SERPL CALC-SCNC: 13 MMOL/L
APPEARANCE: CLEAR
AST SERPL-CCNC: 21 U/L
BACTERIA: NEGATIVE
BILIRUB SERPL-MCNC: 0.7 MG/DL
BILIRUBIN URINE: NEGATIVE
BKV DNA SPEC QL NAA+PROBE: 91 COPIES/ML
BLOOD URINE: ABNORMAL
BUN SERPL-MCNC: 20 MG/DL
CALCIUM SERPL-MCNC: 10.4 MG/DL
CHLORIDE SERPL-SCNC: 102 MMOL/L
CO2 SERPL-SCNC: 24 MMOL/L
COLOR: NORMAL
CREAT SERPL-MCNC: 1.15 MG/DL
GLUCOSE QUALITATIVE U: NEGATIVE
GLUCOSE SERPL-MCNC: 93 MG/DL
HYALINE CASTS: 1 /LPF
KETONES URINE: NEGATIVE
LEUKOCYTE ESTERASE URINE: NEGATIVE
MICROSCOPIC-UA: NORMAL
NITRITE URINE: NEGATIVE
PH URINE: 6
POTASSIUM SERPL-SCNC: 4.4 MMOL/L
PROT SERPL-MCNC: 8.3 G/DL
PROTEIN URINE: ABNORMAL
RED BLOOD CELLS URINE: 3 /HPF
SODIUM SERPL-SCNC: 140 MMOL/L
SPECIFIC GRAVITY URINE: 1.02
SQUAMOUS EPITHELIAL CELLS: 0 /HPF
UROBILINOGEN URINE: NORMAL
WHITE BLOOD CELLS URINE: 1 /HPF

## 2021-03-17 ENCOUNTER — APPOINTMENT (OUTPATIENT)
Dept: NEPHROLOGY | Facility: CLINIC | Age: 21
End: 2021-03-17

## 2021-03-20 ENCOUNTER — APPOINTMENT (OUTPATIENT)
Dept: RHEUMATOLOGY | Facility: CLINIC | Age: 21
End: 2021-03-20
Payer: MEDICAID

## 2021-03-20 PROCEDURE — 96366 THER/PROPH/DIAG IV INF ADDON: CPT

## 2021-03-20 PROCEDURE — 99072 ADDL SUPL MATRL&STAF TM PHE: CPT

## 2021-03-20 PROCEDURE — 96365 THER/PROPH/DIAG IV INF INIT: CPT

## 2021-03-25 NOTE — H&P ADULT - PROBLEM SELECTOR PLAN 4
continue home amlodipine -continue home amlodipine Generalized abd pain associated with nausea and mild tenderness LLQ. LFTs wnl. Likely 2/2 edema/ascites  -continue to monitor QTc 534 likely 2/2 hypomagnesemia and hypocalcemia  -replete and will recheck caffeine

## 2021-05-10 ENCOUNTER — APPOINTMENT (OUTPATIENT)
Dept: RHEUMATOLOGY | Facility: CLINIC | Age: 21
End: 2021-05-10
Payer: MEDICAID

## 2021-05-10 PROCEDURE — 96366 THER/PROPH/DIAG IV INF ADDON: CPT

## 2021-05-10 PROCEDURE — 99072 ADDL SUPL MATRL&STAF TM PHE: CPT

## 2021-05-10 PROCEDURE — 96365 THER/PROPH/DIAG IV INF INIT: CPT

## 2021-07-01 ENCOUNTER — APPOINTMENT (OUTPATIENT)
Dept: RHEUMATOLOGY | Facility: CLINIC | Age: 21
End: 2021-07-01
Payer: MEDICAID

## 2021-07-01 PROCEDURE — 96366 THER/PROPH/DIAG IV INF ADDON: CPT

## 2021-07-01 PROCEDURE — 96365 THER/PROPH/DIAG IV INF INIT: CPT

## 2021-07-13 ENCOUNTER — APPOINTMENT (OUTPATIENT)
Dept: NEPHROLOGY | Facility: CLINIC | Age: 21
End: 2021-07-13
Payer: MEDICAID

## 2021-07-13 LAB
ALBUMIN SERPL ELPH-MCNC: 4.5 G/DL
ALP BLD-CCNC: 144 U/L
ALT SERPL-CCNC: 14 U/L
ANION GAP SERPL CALC-SCNC: 11 MMOL/L
APPEARANCE: CLEAR
AST SERPL-CCNC: 16 U/L
BACTERIA: NEGATIVE
BASOPHILS # BLD AUTO: 0.04 K/UL
BASOPHILS NFR BLD AUTO: 0.6 %
BILIRUB SERPL-MCNC: 0.6 MG/DL
BILIRUBIN URINE: NEGATIVE
BLOOD URINE: ABNORMAL
BUN SERPL-MCNC: 23 MG/DL
CALCIUM SERPL-MCNC: 10 MG/DL
CHLORIDE SERPL-SCNC: 104 MMOL/L
CO2 SERPL-SCNC: 26 MMOL/L
COLOR: NORMAL
CREAT SERPL-MCNC: 1.15 MG/DL
CREAT SPEC-SCNC: 154 MG/DL
CREAT/PROT UR: 0.5 RATIO
EOSINOPHIL # BLD AUTO: 0.19 K/UL
EOSINOPHIL NFR BLD AUTO: 2.7 %
GLUCOSE QUALITATIVE U: NEGATIVE
GLUCOSE SERPL-MCNC: 104 MG/DL
HCT VFR BLD CALC: 42.4 %
HGB BLD-MCNC: 13.9 G/DL
HYALINE CASTS: 0 /LPF
IMM GRANULOCYTES NFR BLD AUTO: 0.1 %
KETONES URINE: NEGATIVE
LDH SERPL-CCNC: 151 U/L
LEUKOCYTE ESTERASE URINE: NEGATIVE
LYMPHOCYTES # BLD AUTO: 2.74 K/UL
LYMPHOCYTES NFR BLD AUTO: 39.5 %
MAGNESIUM SERPL-MCNC: 1.8 MG/DL
MAN DIFF?: NORMAL
MCHC RBC-ENTMCNC: 28.3 PG
MCHC RBC-ENTMCNC: 32.8 GM/DL
MCV RBC AUTO: 86.4 FL
MICROSCOPIC-UA: NORMAL
MONOCYTES # BLD AUTO: 0.69 K/UL
MONOCYTES NFR BLD AUTO: 10 %
NEUTROPHILS # BLD AUTO: 3.26 K/UL
NEUTROPHILS NFR BLD AUTO: 47.1 %
NITRITE URINE: NEGATIVE
PH URINE: 6
PHOSPHATE SERPL-MCNC: 2.6 MG/DL
PLATELET # BLD AUTO: 289 K/UL
POTASSIUM SERPL-SCNC: 4.6 MMOL/L
PROT SERPL-MCNC: 8.2 G/DL
PROT UR-MCNC: 73 MG/DL
PROTEIN URINE: ABNORMAL
RBC # BLD: 4.91 M/UL
RBC # FLD: 13 %
RED BLOOD CELLS URINE: 5 /HPF
SODIUM SERPL-SCNC: 140 MMOL/L
SPECIFIC GRAVITY URINE: 1.02
SQUAMOUS EPITHELIAL CELLS: 0 /HPF
TACROLIMUS SERPL-MCNC: 4.5 NG/ML
URATE SERPL-MCNC: 8.9 MG/DL
UROBILINOGEN URINE: NORMAL
WBC # FLD AUTO: 6.93 K/UL
WHITE BLOOD CELLS URINE: 1 /HPF

## 2021-07-13 PROCEDURE — 99214 OFFICE O/P EST MOD 30 MIN: CPT

## 2021-07-13 NOTE — ASSESSMENT
[FreeTextEntry1] : 1.  Renal transplant - pt s/p second DDRT both at [facility].  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  Cont IVIg given for DSA chronic AMR risk.  Check labs today. \par 2.  Immunosuppression - Pt currently on tacrolimus and prednisone.  Add MMF 250mgs BID as last BK was only 92 and pt has history of pretransplant DSA. \par 3.  HTN - well controlled.  Likely has occasional edema from norvasc. \par 4.  Health maintenance - Discussed Covid and safety precautions during pandemic.  Pt is reluctant to be vaccinated but will consider it. \par \par Will bring pt back for f/u in 3 months. \par

## 2021-07-13 NOTE — HISTORY OF PRESENT ILLNESS
[ Donor] :  donor [TextBox_7] : 6/2029 [de-identified] : PT now getting IVIg at [facility].  Scheduled for monthly but has missed few appointments.  Last BK pcr 92.  Pt noticed occasional edema - currently resolved.   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  First kidney failed in .  Pt went on dialysis for about 1 year then received a DDRT 2019 at [facility].  Early biopsies revealed some PTC and imflammation, he was given thymoglobulin and plasma exchange.  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  Pt follows with Dr. Archer.  Has been doing well.  pt has labs, tacro was 4.8 and creatinine was 0.74mg/dl.  Pt may have had Covid when pandemic started.  Pt denies diabetes, has HTN.  Pt is currently on prograf and prednisone.  MMF was held due to BK virus.  BK peaked at almost 50,000 but trended down below 5000 recently.  He is receiving IVIg monthly at [facility].  Lives in [place].  \par \par For his first transplant he received thymoglobulin induction and had been maintained on prograf, cellcept and prednisone. He underwent a kidney biopsy in  and another one in  which revealed moderate IFTA/vascular changes consistent with chronic rejection versus hypertensive changes.  \par \par Lives with parents, single.  No smoking or alcohol abuse.  Currently working in a school as a .

## 2021-07-15 LAB — CMV DNA SPEC QL NAA+PROBE: NOT DETECTED IU/ML

## 2021-07-20 LAB
BKV DNA SPEC QL NAA+PROBE: 870 COPIES/ML
LOG 10 BK QUANTITATION PCR: 2.94

## 2021-09-02 NOTE — PROVIDER CONTACT NOTE (CRITICAL VALUE NOTIFICATION) - NS PROVIDER READ BACK TO LAB
Her  was insisting on why does, poor balance and poor sleep.she have low energy  [place] was that she is able to do what she wants to do as well as activities of daily living as well as instrumental living in she does house chores at age 84 which is commendable.  She has to appreciate what she is able to do at her age better.  Optimal sleep, exercise on a regular basis good nutrition would probably help her less tired but he was insisting on getting the medication and she was advised to take amantadine 100 mg p.o. daily.  We will start with the 50 mg at bedtime subsequently she takes 50 mg twice a day.   yes

## 2021-09-22 ENCOUNTER — APPOINTMENT (OUTPATIENT)
Dept: RHEUMATOLOGY | Facility: CLINIC | Age: 21
End: 2021-09-22
Payer: MEDICAID

## 2021-09-22 PROCEDURE — 96365 THER/PROPH/DIAG IV INF INIT: CPT

## 2021-09-22 PROCEDURE — 96366 THER/PROPH/DIAG IV INF ADDON: CPT

## 2021-10-20 ENCOUNTER — APPOINTMENT (OUTPATIENT)
Dept: RHEUMATOLOGY | Facility: CLINIC | Age: 21
End: 2021-10-20
Payer: MEDICAID

## 2021-10-20 PROCEDURE — 96365 THER/PROPH/DIAG IV INF INIT: CPT

## 2021-10-20 PROCEDURE — 96366 THER/PROPH/DIAG IV INF ADDON: CPT

## 2021-11-17 ENCOUNTER — APPOINTMENT (OUTPATIENT)
Dept: RHEUMATOLOGY | Facility: CLINIC | Age: 21
End: 2021-11-17
Payer: MEDICAID

## 2021-11-17 PROCEDURE — 96366 THER/PROPH/DIAG IV INF ADDON: CPT

## 2021-11-17 PROCEDURE — 96365 THER/PROPH/DIAG IV INF INIT: CPT

## 2021-12-15 ENCOUNTER — APPOINTMENT (OUTPATIENT)
Dept: NEPHROLOGY | Facility: CLINIC | Age: 21
End: 2021-12-15
Payer: MEDICAID

## 2021-12-15 VITALS
OXYGEN SATURATION: 99 % | BODY MASS INDEX: 31.49 KG/M2 | HEART RATE: 75 BPM | TEMPERATURE: 97.9 F | HEIGHT: 65 IN | WEIGHT: 189 LBS | SYSTOLIC BLOOD PRESSURE: 143 MMHG | DIASTOLIC BLOOD PRESSURE: 80 MMHG | RESPIRATION RATE: 14 BRPM

## 2021-12-15 DIAGNOSIS — N18.5 CHRONIC KIDNEY DISEASE, STAGE 5: ICD-10-CM

## 2021-12-15 DIAGNOSIS — Z23 ENCOUNTER FOR IMMUNIZATION: ICD-10-CM

## 2021-12-15 PROCEDURE — 99213 OFFICE O/P EST LOW 20 MIN: CPT

## 2021-12-15 NOTE — ASSESSMENT
[FreeTextEntry1] : 1.  Renal transplant - pt s/p second DDRT both at [facility].  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  Cont IVIg given for DSA chronic AMR risk.  Check labs today.  If BK negative will consider reducing IVIg and eventually stopping. \par 2.  Immunosuppression - Pt currently on tacrolimus, MMF 250mgs BID and prednisone 5mgs daily.  \par 3.  HTN - well controlled.  Likely has occasional edema from norvasc. \par 4.  Health maintenance - Discussed Covid and safety precautions during pandemic.  Pt is reluctant to be vaccinated.  Explained risks of not being vaccinated.  Gave influenza vaccine today. \par \par Will bring pt back for f/u in 3 months. \par

## 2021-12-15 NOTE — HISTORY OF PRESENT ILLNESS
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  First kidney failed in .  Pt went on dialysis for about 1 year then received a DDRT 2019 at [facility].  Early biopsies revealed some PTC and imflammation, he was given thymoglobulin and plasma exchange.  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  Pt follows with Dr. Archer.  Has been doing well.  pt has labs, tacro was 4.8 and creatinine was 0.74mg/dl.  Pt may have had Covid when pandemic started.  Pt denies diabetes, has HTN.  Pt is currently on prograf and prednisone.  MMF was held due to BK virus.  BK peaked at almost 50,000 but trended down below 5000 recently.  He is receiving IVIg monthly at [place].  Lives in [place].  \par \par For his first transplant he received thymoglobulin induction and had been maintained on prograf, cellcept and prednisone. He underwent a kidney biopsy in  and another one in  which revealed moderate IFTA/vascular changes consistent with chronic rejection versus hypertensive changes.  \par \par Lives with parents, single.  No smoking or alcohol abuse.  Currently working in a school as a .   [de-identified] : PT now getting IVIg at [facility].  Sometimes does not feel well after infusion.  Last BK pcr 400.  Has been exercising and lost weight.  Now working at [employer].

## 2021-12-16 LAB
25(OH)D3 SERPL-MCNC: 17.8 NG/ML
ALBUMIN SERPL ELPH-MCNC: 4.7 G/DL
ALP BLD-CCNC: 95 U/L
ALT SERPL-CCNC: 12 U/L
ANION GAP SERPL CALC-SCNC: 12 MMOL/L
APPEARANCE: CLEAR
AST SERPL-CCNC: 19 U/L
BACTERIA: NEGATIVE
BASOPHILS # BLD AUTO: 0.03 K/UL
BASOPHILS NFR BLD AUTO: 0.4 %
BILIRUB SERPL-MCNC: 0.8 MG/DL
BILIRUBIN URINE: NEGATIVE
BLOOD URINE: ABNORMAL
BUN SERPL-MCNC: 23 MG/DL
CALCIUM SERPL-MCNC: 10.2 MG/DL
CALCIUM SERPL-MCNC: 10.2 MG/DL
CHLORIDE SERPL-SCNC: 105 MMOL/L
CHOLEST SERPL-MCNC: 142 MG/DL
CO2 SERPL-SCNC: 23 MMOL/L
COLOR: NORMAL
CREAT SERPL-MCNC: 1.25 MG/DL
CREAT SPEC-SCNC: 159 MG/DL
CREAT/PROT UR: 0.3 RATIO
EOSINOPHIL # BLD AUTO: 0.11 K/UL
EOSINOPHIL NFR BLD AUTO: 1.6 %
ESTIMATED AVERAGE GLUCOSE: 117 MG/DL
GLUCOSE QUALITATIVE U: NEGATIVE
GLUCOSE SERPL-MCNC: 100 MG/DL
HBA1C MFR BLD HPLC: 5.7 %
HCT VFR BLD CALC: 41.9 %
HDLC SERPL-MCNC: 46 MG/DL
HGB BLD-MCNC: 13.6 G/DL
HYALINE CASTS: 0 /LPF
IMM GRANULOCYTES NFR BLD AUTO: 0.3 %
KETONES URINE: NEGATIVE
LDH SERPL-CCNC: 208 U/L
LDLC SERPL CALC-MCNC: 82 MG/DL
LEUKOCYTE ESTERASE URINE: NEGATIVE
LYMPHOCYTES # BLD AUTO: 2.78 K/UL
LYMPHOCYTES NFR BLD AUTO: 41.3 %
MAGNESIUM SERPL-MCNC: 1.7 MG/DL
MAN DIFF?: NORMAL
MCHC RBC-ENTMCNC: 28.9 PG
MCHC RBC-ENTMCNC: 32.5 GM/DL
MCV RBC AUTO: 89.1 FL
MICROSCOPIC-UA: NORMAL
MONOCYTES # BLD AUTO: 0.66 K/UL
MONOCYTES NFR BLD AUTO: 9.8 %
NEUTROPHILS # BLD AUTO: 3.13 K/UL
NEUTROPHILS NFR BLD AUTO: 46.6 %
NITRITE URINE: NEGATIVE
NONHDLC SERPL-MCNC: 97 MG/DL
PARATHYROID HORMONE INTACT: 59 PG/ML
PH URINE: 6
PHOSPHATE SERPL-MCNC: 2.7 MG/DL
PLATELET # BLD AUTO: 275 K/UL
POTASSIUM SERPL-SCNC: 4.8 MMOL/L
PROT SERPL-MCNC: 7.8 G/DL
PROT UR-MCNC: 48 MG/DL
PROTEIN URINE: ABNORMAL
RBC # BLD: 4.7 M/UL
RBC # FLD: 12.7 %
RED BLOOD CELLS URINE: 4 /HPF
SODIUM SERPL-SCNC: 140 MMOL/L
SPECIFIC GRAVITY URINE: 1.02
SQUAMOUS EPITHELIAL CELLS: 1 /HPF
TACROLIMUS SERPL-MCNC: 7.5 NG/ML
TRIGL SERPL-MCNC: 75 MG/DL
URATE SERPL-MCNC: 8.7 MG/DL
UROBILINOGEN URINE: NORMAL
WBC # FLD AUTO: 6.73 K/UL
WHITE BLOOD CELLS URINE: 1 /HPF

## 2021-12-17 LAB
BKV DNA SPEC QL NAA+PROBE: NOT DETECTED COPIES/ML
CMV DNA SPEC QL NAA+PROBE: NOT DETECTED IU/ML

## 2021-12-22 ENCOUNTER — APPOINTMENT (OUTPATIENT)
Dept: RHEUMATOLOGY | Facility: CLINIC | Age: 21
End: 2021-12-22

## 2022-01-12 ENCOUNTER — APPOINTMENT (OUTPATIENT)
Dept: RHEUMATOLOGY | Facility: CLINIC | Age: 22
End: 2022-01-12
Payer: MEDICAID

## 2022-01-12 PROCEDURE — 96366 THER/PROPH/DIAG IV INF ADDON: CPT

## 2022-01-12 PROCEDURE — 96365 THER/PROPH/DIAG IV INF INIT: CPT

## 2022-02-09 ENCOUNTER — APPOINTMENT (OUTPATIENT)
Dept: RHEUMATOLOGY | Facility: CLINIC | Age: 22
End: 2022-02-09

## 2022-02-11 ENCOUNTER — APPOINTMENT (OUTPATIENT)
Dept: RHEUMATOLOGY | Facility: CLINIC | Age: 22
End: 2022-02-11
Payer: MEDICAID

## 2022-02-11 PROCEDURE — 96365 THER/PROPH/DIAG IV INF INIT: CPT

## 2022-02-11 PROCEDURE — 96366 THER/PROPH/DIAG IV INF ADDON: CPT

## 2022-03-09 ENCOUNTER — APPOINTMENT (OUTPATIENT)
Dept: RHEUMATOLOGY | Facility: CLINIC | Age: 22
End: 2022-03-09
Payer: MEDICAID

## 2022-03-09 PROCEDURE — 96366 THER/PROPH/DIAG IV INF ADDON: CPT

## 2022-03-09 PROCEDURE — 96365 THER/PROPH/DIAG IV INF INIT: CPT

## 2022-03-15 ENCOUNTER — APPOINTMENT (OUTPATIENT)
Dept: NEPHROLOGY | Facility: CLINIC | Age: 22
End: 2022-03-15
Payer: MEDICAID

## 2022-03-15 VITALS
DIASTOLIC BLOOD PRESSURE: 88 MMHG | HEART RATE: 68 BPM | OXYGEN SATURATION: 100 % | RESPIRATION RATE: 14 BRPM | TEMPERATURE: 98 F | HEIGHT: 65 IN | WEIGHT: 189 LBS | SYSTOLIC BLOOD PRESSURE: 148 MMHG | BODY MASS INDEX: 31.49 KG/M2

## 2022-03-15 VITALS — DIASTOLIC BLOOD PRESSURE: 78 MMHG | SYSTOLIC BLOOD PRESSURE: 118 MMHG

## 2022-03-15 PROCEDURE — 99214 OFFICE O/P EST MOD 30 MIN: CPT

## 2022-03-15 RX ORDER — MYCOPHENOLATE MOFETIL 250 MG/1
250 CAPSULE ORAL TWICE DAILY
Qty: 60 | Refills: 11 | Status: DISCONTINUED | COMMUNITY
Start: 2021-07-13 | End: 2022-03-15

## 2022-03-15 NOTE — ASSESSMENT
[FreeTextEntry1] : 1.  Renal transplant - pt s/p second DDRT both at [facility].  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  Cont IVIg given for DSA chronic AMR risk.  Check labs today.  Will attempt to increase MMF then eventually stop IVIg. \par 2.  Immunosuppression - Pt currently on tacrolimus, MMF 250mgs BID and prednisone 5mgs daily.   Increase MMF to 500mgs BID.  Pt has history of high DSA. \par 3.  HTN - well controlled.  Likely has occasional edema from norvasc. \par 4.  Health maintenance - Discussed Covid and safety precautions during pandemic.  Pt is reluctant to be vaccinated.   REcently had Covid - symptoms have resolved. \par \par Will bring pt back for f/u in 3 months. \par

## 2022-03-16 LAB
25(OH)D3 SERPL-MCNC: 27.8 NG/ML
ALBUMIN SERPL ELPH-MCNC: 4.4 G/DL
ALP BLD-CCNC: 93 U/L
ALT SERPL-CCNC: 13 U/L
ANION GAP SERPL CALC-SCNC: 13 MMOL/L
APPEARANCE: CLEAR
AST SERPL-CCNC: 15 U/L
BACTERIA: NEGATIVE
BASOPHILS # BLD AUTO: 0.03 K/UL
BASOPHILS NFR BLD AUTO: 0.5 %
BILIRUB SERPL-MCNC: 0.6 MG/DL
BILIRUBIN URINE: NEGATIVE
BLOOD URINE: ABNORMAL
BUN SERPL-MCNC: 17 MG/DL
CALCIUM SERPL-MCNC: 10.1 MG/DL
CALCIUM SERPL-MCNC: 10.1 MG/DL
CHLORIDE SERPL-SCNC: 103 MMOL/L
CHOLEST SERPL-MCNC: 151 MG/DL
CO2 SERPL-SCNC: 23 MMOL/L
COLOR: NORMAL
COVID-19 SPIKE DOMAIN ANTIBODY INTERPRETATION: POSITIVE
CREAT SERPL-MCNC: 1.04 MG/DL
CREAT SPEC-SCNC: 126 MG/DL
CREAT/PROT UR: 0.4 RATIO
EGFR: 103 ML/MIN/1.73M2
EOSINOPHIL # BLD AUTO: 0.14 K/UL
EOSINOPHIL NFR BLD AUTO: 2.5 %
ESTIMATED AVERAGE GLUCOSE: 111 MG/DL
GLUCOSE QUALITATIVE U: NEGATIVE
GLUCOSE SERPL-MCNC: 97 MG/DL
HBA1C MFR BLD HPLC: 5.5 %
HCT VFR BLD CALC: 42.6 %
HDLC SERPL-MCNC: 49 MG/DL
HGB BLD-MCNC: 14 G/DL
HYALINE CASTS: 1 /LPF
IMM GRANULOCYTES NFR BLD AUTO: 0.2 %
KETONES URINE: NEGATIVE
LDLC SERPL CALC-MCNC: 87 MG/DL
LEUKOCYTE ESTERASE URINE: NEGATIVE
LYMPHOCYTES # BLD AUTO: 2.63 K/UL
LYMPHOCYTES NFR BLD AUTO: 47.4 %
MAGNESIUM SERPL-MCNC: 1.6 MG/DL
MAN DIFF?: NORMAL
MCHC RBC-ENTMCNC: 29 PG
MCHC RBC-ENTMCNC: 32.9 GM/DL
MCV RBC AUTO: 88.2 FL
MICROSCOPIC-UA: NORMAL
MONOCYTES # BLD AUTO: 0.56 K/UL
MONOCYTES NFR BLD AUTO: 10.1 %
NEUTROPHILS # BLD AUTO: 2.18 K/UL
NEUTROPHILS NFR BLD AUTO: 39.3 %
NITRITE URINE: NEGATIVE
NONHDLC SERPL-MCNC: 102 MG/DL
PARATHYROID HORMONE INTACT: 49 PG/ML
PH URINE: 6
PHOSPHATE SERPL-MCNC: 2.9 MG/DL
PLATELET # BLD AUTO: 264 K/UL
POTASSIUM SERPL-SCNC: 4 MMOL/L
PROT SERPL-MCNC: 8.4 G/DL
PROT UR-MCNC: 52 MG/DL
PROTEIN URINE: ABNORMAL
RBC # BLD: 4.83 M/UL
RBC # FLD: 12.5 %
RED BLOOD CELLS URINE: 9 /HPF
SARS-COV-2 AB SERPL IA-ACNC: >250 U/ML
SODIUM SERPL-SCNC: 139 MMOL/L
SPECIFIC GRAVITY URINE: 1.02
SQUAMOUS EPITHELIAL CELLS: 0 /HPF
TACROLIMUS SERPL-MCNC: 7.8 NG/ML
TRIGL SERPL-MCNC: 75 MG/DL
URATE SERPL-MCNC: 6.6 MG/DL
UROBILINOGEN URINE: NORMAL
WBC # FLD AUTO: 5.55 K/UL
WHITE BLOOD CELLS URINE: 1 /HPF

## 2022-03-17 LAB
BKV DNA SPEC QL NAA+PROBE: NOT DETECTED COPIES/ML
CMV DNA SPEC QL NAA+PROBE: NOT DETECTED IU/ML

## 2022-04-08 ENCOUNTER — APPOINTMENT (OUTPATIENT)
Dept: RHEUMATOLOGY | Facility: CLINIC | Age: 22
End: 2022-04-08

## 2022-04-11 ENCOUNTER — APPOINTMENT (OUTPATIENT)
Dept: RHEUMATOLOGY | Facility: CLINIC | Age: 22
End: 2022-04-11
Payer: MEDICAID

## 2022-04-11 PROCEDURE — 96365 THER/PROPH/DIAG IV INF INIT: CPT

## 2022-04-11 PROCEDURE — 96366 THER/PROPH/DIAG IV INF ADDON: CPT

## 2022-05-06 ENCOUNTER — APPOINTMENT (OUTPATIENT)
Dept: RHEUMATOLOGY | Facility: CLINIC | Age: 22
End: 2022-05-06
Payer: MEDICAID

## 2022-05-06 ENCOUNTER — APPOINTMENT (OUTPATIENT)
Dept: RHEUMATOLOGY | Facility: CLINIC | Age: 22
End: 2022-05-06

## 2022-05-06 PROCEDURE — 96366 THER/PROPH/DIAG IV INF ADDON: CPT

## 2022-05-06 PROCEDURE — 96365 THER/PROPH/DIAG IV INF INIT: CPT

## 2022-06-15 ENCOUNTER — APPOINTMENT (OUTPATIENT)
Dept: NEPHROLOGY | Facility: CLINIC | Age: 22
End: 2022-06-15

## 2022-06-17 NOTE — DISCHARGE NOTE ADULT - NSTOBACCOWEBSITE_GEN_A_NCS
[place] Website --- [email or web address] website --- [email or web address] Tazorac Counseling:  Patient advised that medication is irritating and drying.  Patient may need to apply sparingly and wash off after an hour before eventually leaving it on overnight.  The patient verbalized understanding of the proper use and possible adverse effects of tazorac.  All of the patient's questions and concerns were addressed.

## 2022-06-27 ENCOUNTER — OFFICE (OUTPATIENT)
Dept: URBAN - METROPOLITAN AREA CLINIC 109 | Facility: CLINIC | Age: 22
Setting detail: OPHTHALMOLOGY
End: 2022-06-27
Payer: COMMERCIAL

## 2022-06-27 DIAGNOSIS — H40.013: ICD-10-CM

## 2022-06-27 PROCEDURE — 92004 COMPRE OPH EXAM NEW PT 1/>: CPT | Performed by: OPHTHALMOLOGY

## 2022-06-27 PROCEDURE — 76514 ECHO EXAM OF EYE THICKNESS: CPT | Performed by: OPHTHALMOLOGY

## 2022-06-27 PROCEDURE — 92250 FUNDUS PHOTOGRAPHY W/I&R: CPT | Performed by: OPHTHALMOLOGY

## 2022-06-27 PROCEDURE — 92020 GONIOSCOPY: CPT | Performed by: OPHTHALMOLOGY

## 2022-06-27 ASSESSMENT — TONOMETRY
OS_IOP_MMHG: 14
OD_IOP_MMHG: 14

## 2022-06-27 ASSESSMENT — PACHYMETRY
OS_CT_CORRECTION: 1
OD_CT_CORRECTION: 1
OS_CT_UM: 532
OD_CT_UM: 534

## 2022-06-27 ASSESSMENT — DRY EYES - PHYSICIAN NOTES
OD_GENERALCOMMENTS: T
OS_GENERALCOMMENTS: T

## 2022-06-27 ASSESSMENT — VISUAL ACUITY
OD_BCVA: 20/20
OS_BCVA: 20/20

## 2022-06-27 ASSESSMENT — CONFRONTATIONAL VISUAL FIELD TEST (CVF)
OD_FINDINGS: FULL
OS_FINDINGS: FULL

## 2022-08-01 ENCOUNTER — OFFICE (OUTPATIENT)
Dept: URBAN - METROPOLITAN AREA CLINIC 102 | Facility: CLINIC | Age: 22
Setting detail: OPHTHALMOLOGY
End: 2022-08-01
Payer: COMMERCIAL

## 2022-08-01 DIAGNOSIS — H16.223: ICD-10-CM

## 2022-08-01 DIAGNOSIS — H40.013: ICD-10-CM

## 2022-08-01 PROCEDURE — 92202 OPSCPY EXTND ON/MAC DRAW: CPT | Performed by: OPHTHALMOLOGY

## 2022-08-01 PROCEDURE — 92012 INTRM OPH EXAM EST PATIENT: CPT | Performed by: OPHTHALMOLOGY

## 2022-08-01 PROCEDURE — 92133 CPTRZD OPH DX IMG PST SGM ON: CPT | Performed by: OPHTHALMOLOGY

## 2022-08-01 ASSESSMENT — AXIALLENGTH_DERIVED
OS_AL: 24.0602
OD_AL: 23.965

## 2022-08-01 ASSESSMENT — CONFRONTATIONAL VISUAL FIELD TEST (CVF)
OS_FINDINGS: FULL
OD_FINDINGS: FULL

## 2022-08-01 ASSESSMENT — PACHYMETRY
OD_CT_CORRECTION: 1
OS_CT_CORRECTION: 1
OS_CT_UM: 532
OD_CT_UM: 534

## 2022-08-01 ASSESSMENT — KERATOMETRY
OS_AXISANGLE_DEGREES: 086
OS_K1POWER_DIOPTERS: 41.75
OD_K2POWER_DIOPTERS: 43.00
OD_AXISANGLE_DEGREES: 086
OD_K1POWER_DIOPTERS: 42.00
OS_K2POWER_DIOPTERS: 42.75

## 2022-08-01 ASSESSMENT — VISUAL ACUITY
OD_BCVA: 20/20-2
OS_BCVA: 20/20

## 2022-08-01 ASSESSMENT — SUPERFICIAL PUNCTATE KERATITIS (SPK)
OD_SPK: 1+
OS_SPK: 1+

## 2022-08-01 ASSESSMENT — REFRACTION_AUTOREFRACTION
OD_SPHERE: +0.25
OS_AXIS: 168
OD_AXIS: 180
OD_CYLINDER: -0.50
OS_CYLINDER: -1.00
OS_SPHERE: +0.50

## 2022-08-01 ASSESSMENT — TONOMETRY
OD_IOP_MMHG: 14
OS_IOP_MMHG: 14

## 2022-08-01 ASSESSMENT — SPHEQUIV_DERIVED
OS_SPHEQUIV: 0
OD_SPHEQUIV: 0

## 2022-08-01 ASSESSMENT — DRY EYES - PHYSICIAN NOTES
OS_GENERALCOMMENTS: T
OD_GENERALCOMMENTS: T

## 2022-08-11 ENCOUNTER — APPOINTMENT (OUTPATIENT)
Dept: NEPHROLOGY | Facility: CLINIC | Age: 22
End: 2022-08-11

## 2022-08-11 VITALS
HEART RATE: 81 BPM | HEIGHT: 65 IN | RESPIRATION RATE: 16 BRPM | WEIGHT: 189 LBS | DIASTOLIC BLOOD PRESSURE: 79 MMHG | SYSTOLIC BLOOD PRESSURE: 132 MMHG | BODY MASS INDEX: 31.49 KG/M2 | TEMPERATURE: 97.9 F | OXYGEN SATURATION: 98 %

## 2022-08-11 PROCEDURE — 99213 OFFICE O/P EST LOW 20 MIN: CPT

## 2022-08-11 RX ORDER — IMMUNE GLOBULIN INFUSION (HUMAN) 100 MG/ML
5 INJECTION, SOLUTION INTRAVENOUS; SUBCUTANEOUS
Qty: 6 | Refills: 0 | Status: DISCONTINUED | OUTPATIENT
Start: 2021-02-17 | End: 2022-08-11

## 2022-08-11 NOTE — HISTORY OF PRESENT ILLNESS
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  First kidney failed in .  Pt went on dialysis for about 1 year then received a DDRT 2019 at [facility].  Early biopsies revealed some PTC and inflammation, he was given thymoglobulin and plasma exchange.  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  Pt follows with Dr. Archer.  Has been doing well. 2019 pt has labs, tacro was 4.8 and creatinine was 0.74mg/dl.  Pt may have had Covid when pandemic started.  Pt denies diabetes, has HTN.  Pt is currently on prograf and prednisone.  MMF was held due to BK virus.  BK peaked at almost 50,000 but trended down below 5000 recently.  He is receiving IVIg monthly at [facility].  Lives in [place].  \par \par For his first transplant he received thymoglobulin induction and had been maintained on prograf, cellcept and prednisone. He underwent a kidney biopsy in  and another one in  which revealed moderate IFTA/vascular changes consistent with chronic rejection versus hypertensive changes.  \par \par Lives with parents, single.  No smoking or alcohol abuse.  Currently working in a school as a .   [de-identified] : PT now getting IVIg at [facility].  Sometimes does not feel well after infusion.  Last BK pcr negative.  Works at [employer].  Increased MMF last visit and stopped IVIg. He feels well.

## 2022-08-11 NOTE — ASSESSMENT
[FreeTextEntry1] : 1.  Renal transplant - pt s/p second DDRT both at [facility].  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  Cont IVIg given for DSA chronic AMR risk.  Increased MMF and stopped IVIg. \par 2.  Immunosuppression - Pt currently on tacrolimus, MMF 500mgs BID and prednisone 5mgs daily.  Pt has history of high DSA. \par 3.  HTN - well controlled.  Likely has occasional edema from norvasc. \par 4.  Health maintenance - Discussed Covid and safety precautions during pandemic.  Pt is reluctant to be vaccinated.   REcently had Covid - symptoms have resolved. \par \par Will bring pt back for f/u in 3 months. \par

## 2022-08-15 ENCOUNTER — NON-APPOINTMENT (OUTPATIENT)
Age: 22
End: 2022-08-15

## 2022-08-15 LAB
25(OH)D3 SERPL-MCNC: 26.6 NG/ML
ALBUMIN SERPL ELPH-MCNC: 4.6 G/DL
ALP BLD-CCNC: 107 U/L
ALT SERPL-CCNC: 22 U/L
ANION GAP SERPL CALC-SCNC: 12 MMOL/L
APPEARANCE: CLEAR
AST SERPL-CCNC: 34 U/L
BACTERIA: NEGATIVE
BASOPHILS # BLD AUTO: 0.03 K/UL
BASOPHILS NFR BLD AUTO: 0.5 %
BILIRUB SERPL-MCNC: 0.5 MG/DL
BILIRUBIN URINE: NEGATIVE
BKV DNA SPEC QL NAA+PROBE: 61 IU/ML
BLOOD URINE: NORMAL
BUN SERPL-MCNC: 22 MG/DL
CALCIUM SERPL-MCNC: 10.1 MG/DL
CALCIUM SERPL-MCNC: 10.1 MG/DL
CHLORIDE SERPL-SCNC: 105 MMOL/L
CHOLEST SERPL-MCNC: 135 MG/DL
CMV DNA SPEC QL NAA+PROBE: NOT DETECTED IU/ML
CMVPCR LOG: NOT DETECTED LOG10IU/ML
CO2 SERPL-SCNC: 21 MMOL/L
COLOR: NORMAL
CREAT SERPL-MCNC: 1.18 MG/DL
CREAT SPEC-SCNC: 107 MG/DL
CREAT/PROT UR: 0.3 RATIO
EGFR: 88 ML/MIN/1.73M2
EOSINOPHIL # BLD AUTO: 0.1 K/UL
EOSINOPHIL NFR BLD AUTO: 1.5 %
ESTIMATED AVERAGE GLUCOSE: 114 MG/DL
GLUCOSE QUALITATIVE U: NEGATIVE
GLUCOSE SERPL-MCNC: 110 MG/DL
HBA1C MFR BLD HPLC: 5.6 %
HCT VFR BLD CALC: 42.1 %
HDLC SERPL-MCNC: 47 MG/DL
HGB BLD-MCNC: 13.8 G/DL
HYALINE CASTS: 1 /LPF
IMM GRANULOCYTES NFR BLD AUTO: 0.2 %
KETONES URINE: NEGATIVE
LDH SERPL-CCNC: 165 U/L
LDLC SERPL CALC-MCNC: 73 MG/DL
LEUKOCYTE ESTERASE URINE: NEGATIVE
LYMPHOCYTES # BLD AUTO: 1.98 K/UL
LYMPHOCYTES NFR BLD AUTO: 30.4 %
MAGNESIUM SERPL-MCNC: 1.7 MG/DL
MAN DIFF?: NORMAL
MCHC RBC-ENTMCNC: 29.3 PG
MCHC RBC-ENTMCNC: 32.8 GM/DL
MCV RBC AUTO: 89.4 FL
MICROSCOPIC-UA: NORMAL
MONOCYTES # BLD AUTO: 0.53 K/UL
MONOCYTES NFR BLD AUTO: 8.1 %
NEUTROPHILS # BLD AUTO: 3.86 K/UL
NEUTROPHILS NFR BLD AUTO: 59.3 %
NITRITE URINE: NEGATIVE
NONHDLC SERPL-MCNC: 87 MG/DL
PARATHYROID HORMONE INTACT: 36 PG/ML
PH URINE: 6
PHOSPHATE SERPL-MCNC: 2.1 MG/DL
PLATELET # BLD AUTO: 241 K/UL
POTASSIUM SERPL-SCNC: 4.3 MMOL/L
PROT SERPL-MCNC: 7.5 G/DL
PROT UR-MCNC: 27 MG/DL
PROTEIN URINE: ABNORMAL
RBC # BLD: 4.71 M/UL
RBC # FLD: 13.2 %
RED BLOOD CELLS URINE: 2 /HPF
SODIUM SERPL-SCNC: 139 MMOL/L
SPECIFIC GRAVITY URINE: 1.02
SQUAMOUS EPITHELIAL CELLS: 0 /HPF
TACROLIMUS SERPL-MCNC: 5.5 NG/ML
TRIGL SERPL-MCNC: 73 MG/DL
URATE SERPL-MCNC: 7.6 MG/DL
UROBILINOGEN URINE: NORMAL
WBC # FLD AUTO: 6.51 K/UL
WHITE BLOOD CELLS URINE: 0 /HPF

## 2022-08-29 ENCOUNTER — APPOINTMENT (OUTPATIENT)
Dept: TRANSPLANT | Facility: CLINIC | Age: 22
End: 2022-08-29

## 2022-11-17 ENCOUNTER — NON-APPOINTMENT (OUTPATIENT)
Age: 22
End: 2022-11-17

## 2022-11-17 ENCOUNTER — APPOINTMENT (OUTPATIENT)
Dept: NEPHROLOGY | Facility: CLINIC | Age: 22
End: 2022-11-17

## 2022-11-17 VITALS
OXYGEN SATURATION: 99 % | SYSTOLIC BLOOD PRESSURE: 107 MMHG | HEIGHT: 65 IN | TEMPERATURE: 97.8 F | BODY MASS INDEX: 31.16 KG/M2 | DIASTOLIC BLOOD PRESSURE: 66 MMHG | HEART RATE: 76 BPM | RESPIRATION RATE: 16 BRPM | WEIGHT: 187 LBS

## 2022-11-17 LAB
ALBUMIN SERPL ELPH-MCNC: 4.5 G/DL
ALP BLD-CCNC: 98 U/L
ALT SERPL-CCNC: 13 U/L
ANION GAP SERPL CALC-SCNC: 9 MMOL/L
AST SERPL-CCNC: 14 U/L
BASOPHILS # BLD AUTO: 0.03 K/UL
BASOPHILS NFR BLD AUTO: 0.4 %
BILIRUB SERPL-MCNC: 0.8 MG/DL
BUN SERPL-MCNC: 23 MG/DL
CALCIUM SERPL-MCNC: 10.4 MG/DL
CALCIUM SERPL-MCNC: 10.4 MG/DL
CHLORIDE SERPL-SCNC: 105 MMOL/L
CO2 SERPL-SCNC: 25 MMOL/L
CREAT SERPL-MCNC: 1.06 MG/DL
CREAT SPEC-SCNC: 105 MG/DL
CREAT/PROT UR: 0.3 RATIO
EGFR: 101 ML/MIN/1.73M2
EOSINOPHIL # BLD AUTO: 0.08 K/UL
EOSINOPHIL NFR BLD AUTO: 1.2 %
GLUCOSE SERPL-MCNC: 94 MG/DL
HCT VFR BLD CALC: 42.8 %
HGB BLD-MCNC: 14.2 G/DL
IMM GRANULOCYTES NFR BLD AUTO: 0.1 %
LDH SERPL-CCNC: 150 U/L
LYMPHOCYTES # BLD AUTO: 2.2 K/UL
LYMPHOCYTES NFR BLD AUTO: 32.7 %
MAGNESIUM SERPL-MCNC: 1.7 MG/DL
MAN DIFF?: NORMAL
MCHC RBC-ENTMCNC: 29.1 PG
MCHC RBC-ENTMCNC: 33.2 GM/DL
MCV RBC AUTO: 87.7 FL
MONOCYTES # BLD AUTO: 0.64 K/UL
MONOCYTES NFR BLD AUTO: 9.5 %
NEUTROPHILS # BLD AUTO: 3.76 K/UL
NEUTROPHILS NFR BLD AUTO: 56.1 %
PARATHYROID HORMONE INTACT: 36 PG/ML
PHOSPHATE SERPL-MCNC: 2.6 MG/DL
PLATELET # BLD AUTO: 238 K/UL
POTASSIUM SERPL-SCNC: 4.3 MMOL/L
PROT SERPL-MCNC: 7.4 G/DL
PROT UR-MCNC: 27 MG/DL
RBC # BLD: 4.88 M/UL
RBC # FLD: 13.2 %
SODIUM SERPL-SCNC: 139 MMOL/L
TACROLIMUS SERPL-MCNC: 5.8 NG/ML
URATE SERPL-MCNC: 7.3 MG/DL
WBC # FLD AUTO: 6.72 K/UL

## 2022-11-17 PROCEDURE — 99214 OFFICE O/P EST MOD 30 MIN: CPT

## 2022-11-17 NOTE — HISTORY OF PRESENT ILLNESS
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  First kidney failed in .  Pt went on dialysis for about 1 year then received a DDRT 2019 at [facility].  Early biopsies revealed some PTC and inflammation, he was given thymoglobulin and plasma exchange.  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  Pt follows with Dr. Archer.  Has been doing well. 2019 pt has labs, tacro was 4.8 and creatinine was 0.74mg/dl.  Pt may have had Covid when pandemic started.  Pt denies diabetes, has HTN.  Pt is currently on prograf and prednisone.  MMF was held due to BK virus.  BK peaked at almost 50,000 but trended down below 5000 recently.  He is receiving IVIg monthly at [place].  Lives in [place].  \par \par For his first transplant he received thymoglobulin induction and had been maintained on prograf, cellcept and prednisone. He underwent a kidney biopsy in  and another one in  which revealed moderate IFTA/vascular changes consistent with chronic rejection versus hypertensive changes.  \par \par Lives with parents, single.  No smoking or alcohol abuse.  Currently working in a school as a .   [de-identified] : Last BK pcr very low level - 90.  Works at [employer].  Increased MMF last visit and stopped IVIg. He feels well.  No diarrhea, nausea or vomiting.

## 2022-11-17 NOTE — ASSESSMENT
[FreeTextEntry1] : 1.  Renal transplant - pt s/p second DDRT both at [facility].  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  Cont IVIg given for DSA chronic AMR risk.  Increased MMF and stopped IVIg. \par 2.  Immunosuppression - Pt currently on tacrolimus, MMF 500mgs BID and prednisone 5mgs daily.  Pt has history of high DSA.  Has decreasing amounts of proteinuria.  \par 3.  HTN - well controlled.  Likely has occasional edema from norvasc. \par 4.  Health maintenance - Discussed Covid and safety precautions during pandemic.  Pt is reluctant to be vaccinated.   REcently had Covid - symptoms have resolved.  Will give flue vaccine today. \par \par Will bring pt back for f/u in 3 months. \par

## 2022-11-18 LAB
APPEARANCE: CLEAR
BACTERIA: NEGATIVE
BILIRUBIN URINE: NEGATIVE
BLOOD URINE: ABNORMAL
CMV DNA SPEC QL NAA+PROBE: NOT DETECTED IU/ML
CMVPCR LOG: NOT DETECTED LOG10IU/ML
COLOR: NORMAL
GLUCOSE QUALITATIVE U: NEGATIVE
HYALINE CASTS: 0 /LPF
KETONES URINE: NEGATIVE
LEUKOCYTE ESTERASE URINE: NEGATIVE
MICROSCOPIC-UA: NORMAL
NITRITE URINE: NEGATIVE
PH URINE: 6
PROTEIN URINE: ABNORMAL
RED BLOOD CELLS URINE: 2 /HPF
SPECIFIC GRAVITY URINE: 1.02
SQUAMOUS EPITHELIAL CELLS: 1 /HPF
UROBILINOGEN URINE: NORMAL
WHITE BLOOD CELLS URINE: 1 /HPF

## 2022-11-22 ENCOUNTER — NON-APPOINTMENT (OUTPATIENT)
Age: 22
End: 2022-11-22

## 2022-11-22 LAB — BKV DNA SPEC QL NAA+PROBE: 33 IU/ML

## 2023-02-27 ENCOUNTER — APPOINTMENT (OUTPATIENT)
Dept: NEPHROLOGY | Facility: CLINIC | Age: 23
End: 2023-02-27

## 2023-03-29 NOTE — PROVIDER CONTACT NOTE (CRITICAL VALUE NOTIFICATION) - NS PROVIDER READ BACK
[None] : None [FreeTextEntry1] : 67 yr old male presents f/u path from cysto turbt/bxes on 5/23/22. In May 2022, pt went into urinary retention. Pt states that since starting Finasteride, urination has been easier with a stronger flow. Nocturia x 0-1. \par \par Path- benign with squamous metaplasia\par \par Pt taking Tamsulosin, finasteride, potassium citrate and sildenafil. \par \par feeling well overal. No hematuria, retention, flank or abd pain yes

## 2023-05-01 NOTE — PROGRESS NOTE ADULT - SUBJECTIVE AND OBJECTIVE BOX
[facility] Division of Kidney Diseases & Hypertension  FOLLOW UP NOTE  [phone]--------------------------------------------------------------------------------  Chief Complaint: ESRD       24 hour events/subjective:    Patient seen during HD  Denies any complaints.     PAST HISTORY  --------------------------------------------------------------------------------  No significant changes to PMH, PSH, FHx, SHx, unless otherwise noted    ALLERGIES & MEDICATIONS  --------------------------------------------------------------------------------  Allergies    No Known Allergies    Intolerances      Standing Inpatient Medications  amLODIPine   Tablet 10 milliGRAM(s) Oral daily  calcium carbonate 1250 mG  + Vitamin D (OsCal 500 + D) 1 Tablet(s) Oral daily  ferrous    sulfate 325 milliGRAM(s) Oral daily  predniSONE   Tablet 5 milliGRAM(s) Oral daily  sevelamer hydrochloride 1600 milliGRAM(s) Oral three times a day  tacrolimus 6 milliGRAM(s) Oral every 12 hours    PRN Inpatient Medications      REVIEW OF SYSTEMS  --------------------------------------------------------------------------------  Gen: No  fevers/chills  Skin: No rashes  Head/Eyes/Ears/Mouth: No headache; Normal hearing; Normal vision w/o blurriness  Respiratory: No dyspnea, cough, wheezing, hemoptysis  CV: No chest pain, PND, orthopnea  GI: No abdominal pain, diarrhea, constipation, nausea, vomiting  : No increased frequency, dysuria, hematuria, nocturia  MSK: No joint pain/swelling; no back pain; no edema  Neuro: No dizziness/lightheadedness, weakness, seizures, numbness, tingling      All other systems were reviewed and are negative, except as noted.    VITALS/PHYSICAL EXAM  --------------------------------------------------------------------------------  T(C): 37.1 (09-12-18 @ 09:05), Max: 37.1 (09-12-18 @ 09:05)  HR: 110 (09-12-18 @ 09:05) (97 - 112)  BP: 141/77 (09-12-18 @ 09:05) (126/76 - 144/88)  RR: 18 (09-12-18 @ 09:05) (18 - 18)  SpO2: 99% (09-12-18 @ 09:05) (96% - 99%)  Wt(kg): --        09-11-18 @ 07:01  -  09-12-18 @ 07:00  --------------------------------------------------------  IN: 1830 mL / OUT: 1300 mL / NET: 530 mL    09-12-18 @ 07:01  -  09-12-18 @ 12:53  --------------------------------------------------------  IN: 340 mL / OUT: 500 mL / NET: -160 mL      Physical Exam:  	  Gen: NAD  	HEENT: no JVD  	Pulm: CTA B/L  	CV:  S1S2  	Abd: +BS, soft   	Ext: No B/L Lower ext edema  	Neuro: Asterixis +  	Skin: Warm and dry   	Vascular access: Left UE AVF; good thrill and bruit present.     LABS/STUDIES  --------------------------------------------------------------------------------              8.1    10.92 >-----------<  193      [09-12-18 @ 07:34]              23.9     139  |  98  |  46  ----------------------------<  94      [09-12-18 @ 06:03]  3.2   |  22  |  10.32        Ca     7.4     [09-12-18 @ 06:03]      Mg     1.7     [09-12-18 @ 06:03]      Phos  5.1     [09-12-18 @ 06:03]            Creatinine Trend:  SCr 10.32 [09-12 @ 06:03]  SCr 8.81 [09-11 @ 05:52]  SCr 14.63 [09-10 @ 06:02]  SCr 13.33 [09-09 @ 06:17]  SCr 21.47 [09-07 @ 10:31]    Urinalysis - [09-06-18 @ 21:01]      Color Light Yellow / Appearance Clear / SG 1.012 / pH 6.0      Gluc Negative / Ketone Negative  / Bili Negative / Urobili Negative       Blood Small / Protein 300 mg/dL / Leuk Est Negative / Nitrite Negative      RBC 1 / WBC 12 / Hyaline 0 / Gran  / Sq Epi  / Non Sq Epi 1 / Bacteria Negative      Iron 137, TIBC --, %sat --      [09-07-18 @ 12:05]  Ferritin 353      [09-07-18 @ 12:05]  PTH -- (Ca 5.2)      [09-07-18 @ 12:05]   974  Vitamin D (25OH) 11.9      [09-07-18 @ 12:05]    HBsAb 3.5      [09-07-18 @ 20:18]  HBsAg Nonreact      [09-07-18 @ 20:18]  HCV 0.10, Nonreact      [09-07-18 @ 20:18]
Patient is a 18y old  Male who presents with a chief complaint of uremia (08 Sep 2018 14:34)      SUBJECTIVE / OVERNIGHT EVENTS:  Patient feels well.  Had some nausea yesterday towards the end of the day, which now resolved.  Tolerating diet.  Ambulating without assistance.     MEDICATIONS  (STANDING):  amLODIPine   Tablet 10 milliGRAM(s) Oral daily  calcitriol   Capsule 0.25 MICROGram(s) Oral daily  calcium carbonate 1250 mG  + Vitamin D (OsCal 500 + D) 1 Tablet(s) Oral daily  ferrous    sulfate 325 milliGRAM(s) Oral daily  predniSONE   Tablet 5 milliGRAM(s) Oral daily  sevelamer hydrochloride 1600 milliGRAM(s) Oral three times a day  sodium bicarbonate 1950 milliGRAM(s) Oral two times a day    MEDICATIONS  (PRN):      CAPILLARY BLOOD GLUCOSE        I&O's Summary    08 Sep 2018 07:01  -  09 Sep 2018 07:00  --------------------------------------------------------  IN: 1580 mL / OUT: 1500 mL / NET: 80 mL        PHYSICAL EXAM:  Vital Signs Last 24 Hrs  T(C): 36.3 (09 Sep 2018 05:40), Max: 37.1 (08 Sep 2018 21:08)  T(F): 97.4 (09 Sep 2018 05:40), Max: 98.7 (08 Sep 2018 21:08)  HR: 98 (09 Sep 2018 05:40) (93 - 111)  BP: 134/77 (09 Sep 2018 05:40) (125/70 - 147/81)  BP(mean): --  RR: 18 (09 Sep 2018 05:40) (18 - 18)  SpO2: 99% (09 Sep 2018 05:40) (93% - 99%)  GENERAL: NAD, well-developed  HEAD:  Atraumatic, Normocephalic  EYES: EOMI, PERRLA, conjunctiva and sclera clear  NECK: Supple, No JVD  CHEST/LUNG: Clear to auscultation bilaterally; No wheeze  HEART: Regular rate and rhythm; No murmurs, rubs, or gallops  ABDOMEN: Soft, Nontender, Nondistended; Bowel sounds present  EXTREMITIES:  2+ Peripheral Pulses, No clubbing, cyanosis, or edema, LUE fistula  PSYCH: AAOx3  NEUROLOGY: non-focal  SKIN: No rashes or lesions    LABS:                        8.9    10.22 )-----------( 155      ( 09 Sep 2018 07:55 )             25.8     09-09    140  |  96  |  68<H>  ----------------------------<  97  3.0<L>   |  22  |  13.33<H>    Ca    6.7<L>      09 Sep 2018 06:17  Phos  10.7     09-07  Mg     1.8     09-09                RADIOLOGY & ADDITIONAL TESTS:    Imaging Personally Reviewed:    Consultant(s) Notes Reviewed:      Care Discussed with Consultants/Other Providers:
[facility] DIVISION OF KIDNEY DISEASES AND HYPERTENSION -- INITIAL CONSULT NOTE  --------------------------------------------------------------------------------  Chief Complaint: ESRD/Ongoing hemodialysis requirement    24 hour events/subjective:  pt seen on dialysis, feels well.       PAST HISTORY  --------------------------------------------------------------------------------  No significant changes to PMH, PSH, FHx, SHx, unless otherwise noted    ALLERGIES & MEDICATIONS  --------------------------------------------------------------------------------  Allergies    No Known Allergies    Intolerances      Standing Inpatient Medications  amLODIPine   Tablet 10 milliGRAM(s) Oral daily  calcium carbonate 1250 mG  + Vitamin D (OsCal 500 + D) 1 Tablet(s) Oral daily  epoetin charlie Injectable 8000 Unit(s) IV Push <User Schedule>  ferrous    sulfate 325 milliGRAM(s) Oral daily  predniSONE   Tablet 5 milliGRAM(s) Oral daily  sevelamer hydrochloride 1600 milliGRAM(s) Oral three times a day  tacrolimus 6 milliGRAM(s) Oral every 12 hours    PRN Inpatient Medications      REVIEW OF SYSTEMS  --------------------------------------------------------------------------------  Gen: No  fevers/chills, weakness  Skin: No rashes  Respiratory: No dyspnea, cough, wheezing, hemoptysis  CV: No chest pain, PND, orthopnea  GI: No abdominal pain, diarrhea, constipation, nausea, vomiting, melena, hematochezia  : No increased frequency, dysuria, hematuria, nocturia  MSK: No joint pain/swelling; no back pain; no edema  Neuro: No dizziness/lightheadedness, weakness, seizures, numbness, tingling      All other systems were reviewed and are negative, except as noted.    VITALS/PHYSICAL EXAM  --------------------------------------------------------------------------------  T(C): 36.9 (09-15-18 @ 04:48), Max: 37 (09-14-18 @ 21:09)  HR: 90 (09-15-18 @ 04:48) (90 - 101)  BP: 136/80 (09-15-18 @ 04:48) (129/74 - 146/80)  RR: 18 (09-15-18 @ 04:48) (17 - 18)  SpO2: 99% (09-15-18 @ 04:48) (97% - 100%)  Wt(kg): --        09-14-18 @ 07:01  -  09-15-18 @ 07:00  --------------------------------------------------------  IN: 720 mL / OUT: 2050 mL / NET: -1330 mL    09-15-18 @ 07:01  -  09-15-18 @ 09:31  --------------------------------------------------------  IN: 0 mL / OUT: 300 mL / NET: -300 mL      Physical Exam:  	Gen: NAD, well-appearing  	HEENT: PERRL, supple neck, clear oropharynx  	Pulm: CTA B/L  	CV: RRR, S1S2; no rub  	Abd: +BS, soft, nontender/nondistended  	: No suprapubic tenderness  	LE: Warm, FROM, no clubbing, intact strength; no edema  	Skin: Warm, without rashes  	Vascular access: Left AVF    LABS/STUDIES  --------------------------------------------------------------------------------              9.8    10.9  >-----------<  224      [09-15-18 @ 09:00]              28.1     140  |  101  |  43  ----------------------------<  87      [09-14-18 @ 06:04]  4.2   |  22  |  9.27        Ca     7.9     [09-14-18 @ 06:04]            Iron 33, TIBC 221, %sat 15      [09-13-18 @ 07:56]  Ferritin 353      [09-07-18 @ 12:05]  PTH -- (Ca 5.2)      [09-07-18 @ 12:05]   974  Vitamin D (25OH) 11.9      [09-07-18 @ 12:05]    HBsAb 3.5      [09-07-18 @ 20:18]  HBsAg Nonreact      [09-07-18 @ 20:18]  HCV 0.10, Nonreact      [09-07-18 @ 20:18]
[facility] Division of Kidney Diseases & Hypertension  FOLLOW UP NOTE  [phone]--------------------------------------------------------------------------------  Chief Complaint: ESRD       24 hour events/subjective:    Patient seen during HD today  Denies any complaints.     PAST HISTORY  --------------------------------------------------------------------------------  No significant changes to PMH, PSH, FHx, SHx, unless otherwise noted    ALLERGIES & MEDICATIONS  --------------------------------------------------------------------------------  Allergies    No Known Allergies    Intolerances      Standing Inpatient Medications  amLODIPine   Tablet 10 milliGRAM(s) Oral daily  calcium carbonate 1250 mG  + Vitamin D (OsCal 500 + D) 1 Tablet(s) Oral daily  ferrous    sulfate 325 milliGRAM(s) Oral daily  predniSONE   Tablet 5 milliGRAM(s) Oral daily  sevelamer hydrochloride 1600 milliGRAM(s) Oral three times a day  tacrolimus 6 milliGRAM(s) Oral every 12 hours    PRN Inpatient Medications      REVIEW OF SYSTEMS  --------------------------------------------------------------------------------  Gen: No  fevers/chills  Skin: No rashes  Head/Eyes/Ears/Mouth: No headache; Normal hearing; Normal vision w/o blurriness  Respiratory: No dyspnea, cough, wheezing, hemoptysis  CV: No chest pain, PND, orthopnea  GI: No abdominal pain, diarrhea, constipation, nausea, vomiting  : No increased frequency, dysuria, hematuria, nocturia  MSK: No joint pain/swelling; no back pain; no edema  Neuro: No dizziness/lightheadedness, weakness, seizures, numbness, tingling      All other systems were reviewed and are negative, except as noted.    VITALS/PHYSICAL EXAM  --------------------------------------------------------------------------------  T(C): 36.6 (09-10-18 @ 08:45), Max: 36.9 (09-09-18 @ 21:01)  HR: 102 (09-10-18 @ 08:45) (96 - 102)  BP: 151/80 (09-10-18 @ 08:45) (123/74 - 151/80)  RR: 18 (09-10-18 @ 08:45) (18 - 18)  SpO2: 98% (09-10-18 @ 08:45) (96% - 98%)  Wt(kg): --        09-09-18 @ 07:01  -  09-10-18 @ 07:00  --------------------------------------------------------  IN: 960 mL / OUT: 0 mL / NET: 960 mL    09-10-18 @ 07:01  -  09-10-18 @ 12:24  --------------------------------------------------------  IN: 0 mL / OUT: 600 mL / NET: -600 mL      Physical Exam:  	  Gen: NAD  	HEENT: no JVD  	Pulm: CTA B/L  	CV:  S1S2  	Abd: +BS, soft   	Ext: No B/L Lower ext edema  	Neuro: Asterixis +  	Skin: Warm and dry   	Vascular access: Left UE AVF; good thrill and bruit present.     LABS/STUDIES  --------------------------------------------------------------------------------              8.5    9.28  >-----------<  172      [09-10-18 @ 08:06]              25.6     132  |  93  |  73  ----------------------------<  92      [09-10-18 @ 06:02]  3.0   |  22  |  14.63        Ca     6.9     [09-10-18 @ 06:02]      Mg     1.7     [09-10-18 @ 06:02]            Creatinine Trend:  SCr 14.63 [09-10 @ 06:02]  SCr 13.33 [09-09 @ 06:17]  SCr 21.47 [09-07 @ 10:31]  SCr 22.25 [09-06 @ 21:01]    Urinalysis - [09-06-18 @ 21:01]      Color Light Yellow / Appearance Clear / SG 1.012 / pH 6.0      Gluc Negative / Ketone Negative  / Bili Negative / Urobili Negative       Blood Small / Protein 300 mg/dL / Leuk Est Negative / Nitrite Negative      RBC 1 / WBC 12 / Hyaline 0 / Gran  / Sq Epi  / Non Sq Epi 1 / Bacteria Negative      Iron 137, TIBC --, %sat --      [09-07-18 @ 12:05]  Ferritin 353      [09-07-18 @ 12:05]  PTH -- (Ca 5.2)      [09-07-18 @ 12:05]   974  Vitamin D (25OH) 11.9      [09-07-18 @ 12:05]    HBsAb 3.5      [09-07-18 @ 20:18]  HBsAg Nonreact      [09-07-18 @ 20:18]  HCV 0.10, Nonreact      [09-07-18 @ 20:18]
[facility] DIVISION OF KIDNEY DISEASES AND HYPERTENSION   --------------------------------------------------------------------------------  Chief Complaint: ESRD/Ongoing hemodialysis requirement    24 hour events/subjective:    Seen on  dialysis 2nd treatment    PAST HISTORY  --------------------------------------------------------------------------------  No significant changes to PMH, PSH, FHx, SHx, unless otherwise noted    ALLERGIES & MEDICATIONS  --------------------------------------------------------------------------------  Allergies    No Known Allergies    Intolerances      Standing Inpatient Medications  amLODIPine   Tablet 10 milliGRAM(s) Oral daily  calcitriol   Capsule 0.25 MICROGram(s) Oral daily  calcium carbonate 1250 mG  + Vitamin D (OsCal 500 + D) 1 Tablet(s) Oral daily  ferrous    sulfate 325 milliGRAM(s) Oral daily  predniSONE   Tablet 5 milliGRAM(s) Oral daily  sevelamer hydrochloride 1600 milliGRAM(s) Oral three times a day  sodium bicarbonate 1950 milliGRAM(s) Oral two times a day    PRN Inpatient Medications      REVIEW OF SYSTEMS  --------------------------------------------------------------------------------  Gen: No  fevers/chills, weakness  Skin: No rashes  Respiratory: No dyspnea, cough, wheezing, hemoptysis  CV: No chest pain, PND, orthopnea  GI: No abdominal pain, diarrhea, constipation, nausea, vomiting, melena, hematochezia  : No increased frequency, dysuria, hematuria, nocturia  MSK: No joint pain/swelling; no back pain; no edema  Neuro: No dizziness/lightheadedness, weakness, seizures, numbness, tingling      All other systems were reviewed and are negative, except as noted.    VITALS/PHYSICAL EXAM  --------------------------------------------------------------------------------  T(C): 37 (09-08-18 @ 06:02), Max: 37.1 (09-07-18 @ 15:24)  HR: 99 (09-08-18 @ 06:02) (88 - 106)  BP: 134/78 (09-08-18 @ 06:02) (127/68 - 152/83)  RR: 18 (09-08-18 @ 06:02) (18 - 18)  SpO2: 93% (09-08-18 @ 06:02) (93% - 99%)  Wt(kg): --  Height (cm): 167.64 (09-07-18 @ 03:14)  Weight (kg): 82.1 (09-07-18 @ 04:51)  BMI (kg/m2): 29.2 (09-07-18 @ 04:51)  BSA (m2): 1.92 (09-07-18 @ 04:51)      09-07-18 @ 07:01  -  09-08-18 @ 07:00  --------------------------------------------------------  IN: 990 mL / OUT: 1000 mL / NET: -10 mL      Physical Exam:  	Gen: NAD,   	HEENT: PERRL,   	Pulm: CTA B/L  	CV: RRR, S1S2; no rub  	Abd: +BS, soft, nontender/nondistended  	LE: Warm, FROM, no clubbing, intact strength; no edema  	Skin: Warm, without rashes  	Vascular access: avf    LABS/STUDIES  --------------------------------------------------------------------------------              8.4    8.4   >-----------<  146      [09-07-18 @ 10:33]              24.4     138  |  97  |  157  ----------------------------<  96      [09-07-18 @ 10:31]  3.9   |  12  |  21.47        Ca     6.2     [09-08-18 @ 00:37]      Mg     1.5     [09-07-18 @ 10:31]      Phos  10.7     [09-07-18 @ 10:31]    TPro  7.5  /  Alb  4.1  /  TBili  0.4  /  DBili  0.1  /  AST  7   /  ALT  7   /  AlkPhos  84  [09-06-18 @ 21:01]              [09-07-18 @ 10:31]    Iron 137, TIBC --, %sat --      [09-07-18 @ 12:05]  Ferritin 353      [09-07-18 @ 12:05]  PTH -- (Ca 5.2)      [09-07-18 @ 12:05]   974  Vitamin D (25OH) 11.9      [09-07-18 @ 12:05]    HBsAb 3.5      [09-07-18 @ 20:18]  HBsAg Nonreact      [09-07-18 @ 20:18]  HCV 0.10, Nonreact      [09-07-18 @ 20:18]
Patient is a 18y old  Male who presents with a chief complaint of malaise, nausea (07 Sep 2018 02:25)      INTERVAL HPI/OVERNIGHT EVENTS:  No acute overnight events        Acute renal failure  No pertinent family history in first degree relatives  Handoff  MEWS Score  Anemia  Hypertension  HLD (hyperlipidemia)  Atrophic kidney  Kidney Transplant  Acute renal failure superimposed on chronic kidney disease, unspecified CKD stage, unspecified acute renal failure type  Hypomagnesemia  Hypocalcemia  Hyperphosphatemia  Prolonged QT interval  Abdominal pain  Prophylactic measure  Hypertension  Kidney transplant rejection  Acute renal failure superimposed on stage 5 chronic kidney disease, not on chronic dialysis, unspecified acute renal failure type  Anemia  Kidney transplant failure and rejection  H/O kidney transplant  ABD PAIN NAUSEA  Anemia in chronic kidney disease, unspecified CKD stage  Metabolic acidemia      Review of Systems: 12 point review of systems otherwise negative  ( - )fevers/chills  ( - ) dyspnea  ( - ) cough  ( - ) chest pain  ( - ) palpatations  ( - ) dizziness/lightheadedness  ( - ) nausea/vomiting  ( - ) abd pain  ( - ) diarrhea  ( - ) melena  ( - ) hematochezia  ( - ) dysuria  ( - ) hematuria  ( - ) leg swelling  ( -) calf tenderness  ( - ) motor weakness  ( - ) extremity numbness  ( - ) back pain  ( + ) tolerating POs  ( + ) BM    MEDICATIONS  (STANDING):  amLODIPine   Tablet 10 milliGRAM(s) Oral daily  calcitriol   Capsule 0.25 MICROGram(s) Oral daily  calcium carbonate 1250 mG  + Vitamin D (OsCal 500 + D) 1 Tablet(s) Oral daily  ferrous    sulfate 325 milliGRAM(s) Oral daily  magnesium sulfate  IVPB 2 Gram(s) IV Intermittent once  predniSONE   Tablet 5 milliGRAM(s) Oral daily  sevelamer hydrochloride 1600 milliGRAM(s) Oral three times a day  sodium bicarbonate 1950 milliGRAM(s) Oral two times a day    MEDICATIONS  (PRN):      Allergies    No Known Allergies    Intolerances          Vital Signs Last 24 Hrs  T(C): 36.9 (07 Sep 2018 08:35), Max: 36.9 (07 Sep 2018 08:35)  T(F): 98.4 (07 Sep 2018 08:35), Max: 98.4 (07 Sep 2018 08:35)  HR: 102 (07 Sep 2018 08:35) (92 - 115)  BP: 121/69 (07 Sep 2018 08:35) (121/69 - 137/81)  BP(mean): --  RR: 18 (07 Sep 2018 08:35) (18 - 18)  SpO2: 95% (07 Sep 2018 08:35) (95% - 100%)  CAPILLARY BLOOD GLUCOSE           @ 07:01  -   @ 07:00  --------------------------------------------------------  IN: 420 mL / OUT: 0 mL / NET: 420 mL        Physical Exam:    Daily Height in cm: 167.64 (07 Sep 2018 03:14)    Daily   General:  Well appearing, NAD  HEENT:  Nonicteric, PERRLA  CV:  RRR, no murmur,   Lungs:  CTA B/L, no wheezes,   Abdomen:  Soft,  mildly tender, no distended, positive BS,  Extremities:  2+ pulses, no c/c, no edema  Skin:  Warm and dry, no rashes  :  No deshpande  Neuro:  AAOx3, non-focal, CN II-XII grossly intact  No Restraints    LABS:                        8.4    8.4   )-----------( 146      ( 07 Sep 2018 10:33 )             24.4         138  |  97  |  157<H>  ----------------------------<  96  3.9   |  12<L>  |  21.47<H>    Ca    5.3<LL>      07 Sep 2018 10:31  Phos  10.7       Mg     1.5         TPro  7.5  /  Alb  4.1  /  TBili  0.4  /  DBili  0.1  /  AST  7<L>  /  ALT  7<L>  /  AlkPhos  84        Urinalysis Basic - ( 06 Sep 2018 21:01 )    Color: Light Yellow / Appearance: Clear / S.012 / pH: x  Gluc: x / Ketone: Negative  / Bili: Negative / Urobili: Negative   Blood: x / Protein: 300 mg/dL / Nitrite: Negative   Leuk Esterase: Negative / RBC: 1 /hpf / WBC 12 /hpf   Sq Epi: x / Non Sq Epi: 1 /hpf / Bacteria: Negative          RADIOLOGY & ADDITIONAL TESTS:    ---------------------------------------------------------------------------  I personally reviewed: [  ]EKG   [  ]CXR    [  ] CT    [  ]Other  ---------------------------------------------------------------------------  PLEASE CHECK WHEN PRESENT:     [  ]Heart Failure     [  ] Acute     [  ] Acute on Chronic     [  ] Chronic  -------------------------------------------------------------------     [  ]Diastolic [HFpEF]     [  ]Systolic [HFrEF]     [  ]Combined [HFpEF & HFrEF]     [  ]Other:  -------------------------------------------------------------------  [  ]MARCUS     [  ]ATN     [  ]Reneal Medullary Necrosis     [  ]Renal Cortical Necrosis     [  ]Other Pathological Lesions:    [  ]CKD 1  [  ]CKD 2  [  ]CKD 3  [  ]CKD 4  [  ]CKD 5  [  ]Other  -------------------------------------------------------------------  [  ]Other/Unspecified:    --------------------------------------------------------------------    Abdominal Nutritional Status  [  ]Malnutrition: See Nutrition Note  [  ]Cachexia  [  ]Other:   [  ]Supplement Ordered:  [  ]Morbid Obesity (BMI >=40]
Patient is a 18y old  Male who presents with a chief complaint of malaise, nausea (07 Sep 2018 16:49)      SUBJECTIVE / OVERNIGHT EVENTS:   No acute events overnight.  Feels well.   No nausea, vomiting, pain, constipation.  Slept well.  Tolerated HD well.     MEDICATIONS  (STANDING):  amLODIPine   Tablet 10 milliGRAM(s) Oral daily  calcitriol   Capsule 0.25 MICROGram(s) Oral daily  calcium carbonate 1250 mG  + Vitamin D (OsCal 500 + D) 1 Tablet(s) Oral daily  ferrous    sulfate 325 milliGRAM(s) Oral daily  predniSONE   Tablet 5 milliGRAM(s) Oral daily  sevelamer hydrochloride 1600 milliGRAM(s) Oral three times a day  sodium bicarbonate 1950 milliGRAM(s) Oral two times a day    MEDICATIONS  (PRN):      CAPILLARY BLOOD GLUCOSE        I&O's Summary    07 Sep 2018 07:01  -  08 Sep 2018 07:00  --------------------------------------------------------  IN: 990 mL / OUT: 1000 mL / NET: -10 mL        PHYSICAL EXAM:  Vital Signs Last 24 Hrs  T(C): 37 (08 Sep 2018 06:02), Max: 37.1 (07 Sep 2018 15:24)  T(F): 98.6 (08 Sep 2018 06:02), Max: 98.7 (07 Sep 2018 15:24)  HR: 99 (08 Sep 2018 06:02) (88 - 106)  BP: 134/78 (08 Sep 2018 06:02) (127/68 - 152/83)  BP(mean): --  RR: 18 (08 Sep 2018 06:02) (18 - 18)  SpO2: 93% (08 Sep 2018 06:02) (93% - 99%)  GENERAL: NAD, well-developed  HEAD:  Atraumatic, Normocephalic  EYES: EOMI, PERRLA, conjunctiva and sclera clear  NECK: Supple, No JVD  CHEST/LUNG: Clear to auscultation bilaterally; No wheeze  HEART: Regular rate and rhythm; No murmurs, rubs, or gallops  ABDOMEN: Soft, Nontender, Nondistended; Bowel sounds present  EXTREMITIES:  2+ Peripheral Pulses, No clubbing, cyanosis, or edema, LUE fistula  PSYCH: AAOx3  NEUROLOGY: non-focal  SKIN: No rashes or lesions    LABS:                        8.4    8.4   )-----------( 146      ( 07 Sep 2018 10:33 )             24.4     -    138  |  97  |  157<H>  ----------------------------<  96  3.9   |  12<L>  |  21.47<H>    Ca    6.2<LL>      08 Sep 2018 00:37  Phos  10.7       Mg     1.5         TPro  7.5  /  Alb  4.1  /  TBili  0.4  /  DBili  0.1  /  AST  7<L>  /  ALT  7<L>  /  AlkPhos  84            Urinalysis Basic - ( 06 Sep 2018 21:01 )    Color: Light Yellow / Appearance: Clear / S.012 / pH: x  Gluc: x / Ketone: Negative  / Bili: Negative / Urobili: Negative   Blood: x / Protein: 300 mg/dL / Nitrite: Negative   Leuk Esterase: Negative / RBC: 1 /hpf / WBC 12 /hpf   Sq Epi: x / Non Sq Epi: 1 /hpf / Bacteria: Negative        RADIOLOGY & ADDITIONAL TESTS:    Imaging Personally Reviewed:    Consultant(s) Notes Reviewed:      Care Discussed with Consultants/Other Providers:
Patient is a 18y old  Male who presents with a chief complaint of uremia (08 Sep 2018 14:34)      SUBJECTIVE / OVERNIGHT EVENTS:  Patient feels well.  Had some nausea yesterday towards the end of the day, which now resolved.  Tolerating diet.  Ambulating without assistance.     T(C): 36.8 (09-10-18 @ 21:27), Max: 37 (09-10-18 @ 13:34)  HR: 89 (09-10-18 @ 21:27) (78 - 89)  BP: 134/84 (09-10-18 @ 21:27) (134/84 - 139/88)  RR: 18 (09-10-18 @ 21:27) (18 - 18)  SpO2: 97% (09-10-18 @ 21:27) (96% - 97%)    MEDICATIONS  (STANDING):  amLODIPine   Tablet 10 milliGRAM(s) Oral daily  calcium carbonate 1250 mG  + Vitamin D (OsCal 500 + D) 1 Tablet(s) Oral daily  ferrous    sulfate 325 milliGRAM(s) Oral daily  predniSONE   Tablet 5 milliGRAM(s) Oral daily  sevelamer hydrochloride 1600 milliGRAM(s) Oral three times a day  tacrolimus 6 milliGRAM(s) Oral every 12 hours    MEDICATIONS  (PRN):              PHYSICAL EXAM:  GENERAL: NAD, well-developed  HEAD:  Atraumatic, Normocephalic  EYES: EOMI, conjunctiva and sclera clear  NECK: Supple, No JVD  CHEST/LUNG: Clear to auscultation bilaterally; No wheeze  HEART: Regular rate and rhythm; No murmurs, rubs, or gallops  ABDOMEN: Soft, Nontender, Nondistended; Bowel sounds present  EXTREMITIES:  2+ Peripheral Pulses, No clubbing, cyanosis, or edema, LUE fistula  PSYCH: AAOx3  NEUROLOGY: non-focal  SKIN: No rashes or lesions                          8.5    9.28  )-----------( 172      ( 10 Sep 2018 08:06 )             25.6               132|93|73<92  3.0|22|14.63  6.9,1.7,--  09-10 @ 06:02    RADIOLOGY & ADDITIONAL TESTS:    Imaging Personally Reviewed:    Consultant(s) Notes Reviewed:      Care Discussed with Consultants/Other Providers:
Patient is a 18y old  Male who presents with a chief complaint of uremia (08 Sep 2018 14:34)      SUBJECTIVE / OVERNIGHT EVENTS:  Patient feels well.  No events over night.   ROS other wise negative.      T(C): 36.7 (09-11-18 @ 13:28), Max: 37.1 (09-11-18 @ 05:14)  HR: 112 (09-11-18 @ 13:28) (94 - 112)  BP: 126/76 (09-11-18 @ 13:28) (124/73 - 126/76)  RR: 18 (09-11-18 @ 13:28) (18 - 18)  SpO2: 97% (09-11-18 @ 13:28) (97% - 98%)    MEDICATIONS  (STANDING):  amLODIPine   Tablet 10 milliGRAM(s) Oral daily  calcium carbonate 1250 mG  + Vitamin D (OsCal 500 + D) 1 Tablet(s) Oral daily  ferrous    sulfate 325 milliGRAM(s) Oral daily  predniSONE   Tablet 5 milliGRAM(s) Oral daily  sevelamer hydrochloride 1600 milliGRAM(s) Oral three times a day  tacrolimus 6 milliGRAM(s) Oral every 12 hours    MEDICATIONS  (PRN):    PHYSICAL EXAM:  GENERAL: NAD, well-developed  HEAD:  Atraumatic, Normocephalic  EYES: EOMI, conjunctiva and sclera clear  NECK: Supple, No JVD  CHEST/LUNG: Clear to auscultation bilaterally; No wheeze  HEART: Regular rate and rhythm; No murmurs, rubs, or gallops  ABDOMEN: Soft, Nontender, Nondistended; Bowel sounds present  EXTREMITIES:  2+ Peripheral Pulses, No clubbing, cyanosis, or edema, LUE fistula  PSYCH: AAOx3  NEUROLOGY: non-focal  SKIN: No rashes or lesions                                          8.4    10.40 )-----------( 180      ( 11 Sep 2018 07:50 )             25.1               140|99|38<97  3.2|24|8.81  7.3,1.9,4.9  09-11 @ 05:52    RADIOLOGY & ADDITIONAL TESTS:    Imaging Personally Reviewed:    Consultant(s) Notes Reviewed:      Care Discussed with Consultants/Other Providers:
Patient is a 18y old  Male who presents with a chief complaint of uremia (08 Sep 2018 14:34)      SUBJECTIVE / OVERNIGHT EVENTS:  Patient feels well. No new complaints.   No events over night.   ROS other wise negative.       T(C): 37.1 (09-15-18 @ 12:05), Max: 37.3 (09-15-18 @ 08:35)  HR: 97 (09-15-18 @ 12:05) (97 - 101)  BP: 130/77 (09-15-18 @ 12:05) (130/77 - 145/85)  RR: 18 (09-15-18 @ 12:05) (17 - 18)  SpO2: 100% (09-15-18 @ 12:05) (99% - 100%)    MEDICATIONS  (STANDING):  amLODIPine   Tablet 10 milliGRAM(s) Oral daily  calcium carbonate 1250 mG  + Vitamin D (OsCal 500 + D) 1 Tablet(s) Oral daily  epoetin charlie Injectable 8000 Unit(s) IV Push <User Schedule>  ergocalciferol 76491 Unit(s) Oral every week  ferrous    sulfate 325 milliGRAM(s) Oral daily  predniSONE   Tablet 5 milliGRAM(s) Oral daily  sevelamer hydrochloride 1600 milliGRAM(s) Oral three times a day  tacrolimus 6 milliGRAM(s) Oral every 12 hours    MEDICATIONS  (PRN):      PHYSICAL EXAM:  GENERAL: NAD, well-developed  HEAD:  Atraumatic, Normocephalic  EYES: EOMI, conjunctiva and sclera clear  NECK: Supple, No JVD  CHEST/LUNG: Clear to auscultation bilaterally; No wheeze  HEART: Regular rate and rhythm; No murmurs, rubs, or gallops  ABDOMEN: Soft, Nontender, Nondistended; Bowel sounds present  EXTREMITIES:  2+ Peripheral Pulses, No clubbing, cyanosis, or edema, LUE fistula  PSYCH: AAOx3  NEUROLOGY: non-focal  SKIN: No rashes or lesions                                             9.8    10.9  )-----------( 224      ( 15 Sep 2018 09:00 )             28.1               136|97|37<97  3.7|25|7.76  8.6,--,--  09-15 @ 09:00      RADIOLOGY & ADDITIONAL TESTS:    Imaging Personally Reviewed:    Consultant(s) Notes Reviewed:      Care Discussed with Consultants/Other Providers:
Patient is a 18y old  Male who presents with a chief complaint of uremia (08 Sep 2018 14:34)      SUBJECTIVE / OVERNIGHT EVENTS:  Patient feels well. No new complaints.   No events over night.   ROS other wise negative.      T(C): 37.4 (09-13-18 @ 13:26), Max: 37.4 (09-13-18 @ 13:26)  HR: 107 (09-13-18 @ 13:26) (107 - 107)  BP: 126/76 (09-13-18 @ 13:26) (126/76 - 126/76)  RR: 18 (09-13-18 @ 13:26) (18 - 18)  SpO2: 97% (09-13-18 @ 13:26) (97% - 97%)      MEDICATIONS  (STANDING):  amLODIPine   Tablet 10 milliGRAM(s) Oral daily  calcium carbonate 1250 mG  + Vitamin D (OsCal 500 + D) 1 Tablet(s) Oral daily  epoetin charlie Injectable 8000 Unit(s) IV Push <User Schedule>  ferrous    sulfate 325 milliGRAM(s) Oral daily  predniSONE   Tablet 5 milliGRAM(s) Oral daily  sevelamer hydrochloride 1600 milliGRAM(s) Oral three times a day  tacrolimus 6 milliGRAM(s) Oral every 12 hours    MEDICATIONS  (PRN):    PHYSICAL EXAM:  GENERAL: NAD, well-developed  HEAD:  Atraumatic, Normocephalic  EYES: EOMI, conjunctiva and sclera clear  NECK: Supple, No JVD  CHEST/LUNG: Clear to auscultation bilaterally; No wheeze  HEART: Regular rate and rhythm; No murmurs, rubs, or gallops  ABDOMEN: Soft, Nontender, Nondistended; Bowel sounds present  EXTREMITIES:  2+ Peripheral Pulses, No clubbing, cyanosis, or edema, LUE fistula  PSYCH: AAOx3  NEUROLOGY: non-focal  SKIN: No rashes or lesions                                              7.5    10.92 )-----------( 203      ( 13 Sep 2018 07:53 )             23.3               140|100|24<89  3.7|26|7.54  7.9,1.8,4.9  09-13 @ 06:11           RADIOLOGY & ADDITIONAL TESTS:    Imaging Personally Reviewed:    Consultant(s) Notes Reviewed:      Care Discussed with Consultants/Other Providers:
[facility] Division of Kidney Diseases & Hypertension  FOLLOW UP NOTE  [phone]--------------------------------------------------------------------------------  Chief Complaint: ESRD     24 hour events/subjective:    Patient seen during HD today  Denies any complaints   Scheduled for discharge today     PAST HISTORY  --------------------------------------------------------------------------------  No significant changes to PMH, PSH, FHx, SHx, unless otherwise noted    ALLERGIES & MEDICATIONS  --------------------------------------------------------------------------------  Allergies    No Known Allergies    Intolerances      Standing Inpatient Medications  amLODIPine   Tablet 10 milliGRAM(s) Oral daily  calcium carbonate 1250 mG  + Vitamin D (OsCal 500 + D) 1 Tablet(s) Oral daily  epoetin charlie Injectable 8000 Unit(s) IV Push <User Schedule>  ferrous    sulfate 325 milliGRAM(s) Oral daily  predniSONE   Tablet 5 milliGRAM(s) Oral daily  sevelamer hydrochloride 1600 milliGRAM(s) Oral three times a day  tacrolimus 6 milliGRAM(s) Oral every 12 hours    PRN Inpatient Medications      REVIEW OF SYSTEMS  --------------------------------------------------------------------------------  Gen: No  fevers/chills  Skin: No rashes  Head/Eyes/Ears/Mouth: No headache; Normal hearing; Normal vision w/o blurriness  Respiratory: No dyspnea, cough, wheezing, hemoptysis  CV: No chest pain, PND, orthopnea  GI: No abdominal pain, diarrhea, constipation, nausea, vomiting  : No increased frequency, dysuria, hematuria, nocturia  MSK: No joint pain/swelling; no back pain; no edema  Neuro: No dizziness/lightheadedness, weakness, seizures, numbness, tingling      All other systems were reviewed and are negative, except as noted.    VITALS/PHYSICAL EXAM  --------------------------------------------------------------------------------  T(C): 36.9 (09-14-18 @ 11:00), Max: 37.4 (09-13-18 @ 13:26)  HR: 95 (09-14-18 @ 11:00) (93 - 107)  BP: 129/74 (09-14-18 @ 11:00) (119/72 - 131/75)  RR: 18 (09-14-18 @ 11:00) (18 - 18)  SpO2: 100% (09-14-18 @ 11:00) (97% - 100%)  Wt(kg): --        09-13-18 @ 07:01  -  09-14-18 @ 07:00  --------------------------------------------------------  IN: 1120 mL / OUT: 300 mL / NET: 820 mL    09-14-18 @ 07:01  -  09-14-18 @ 11:32  --------------------------------------------------------  IN: 240 mL / OUT: 0 mL / NET: 240 mL      Physical Exam:  	  Gen: NAD  	HEENT: no JVD  	Pulm: CTA B/L  	CV:  S1S2  	Abd: +BS, soft   	Ext: No B/L Lower ext edema  	Neuro: Asterixis +  	Skin: Warm and dry   	Vascular access: Left UE AVF; good thrill and bruit present.     LABS/STUDIES  --------------------------------------------------------------------------------              7.7    8.32  >-----------<  202      [09-14-18 @ 07:52]              22.5     140  |  101  |  43  ----------------------------<  87      [09-14-18 @ 06:04]  4.2   |  22  |  9.27        Ca     7.9     [09-14-18 @ 06:04]      Mg     1.8     [09-13-18 @ 06:11]      Phos  4.9     [09-13-18 @ 06:11]            Creatinine Trend:  SCr 9.27 [09-14 @ 06:04]  SCr 7.54 [09-13 @ 06:11]  SCr 10.32 [09-12 @ 06:03]  SCr 8.81 [09-11 @ 05:52]  SCr 14.63 [09-10 @ 06:02]        Iron 33, TIBC 221, %sat 15      [09-13-18 @ 07:56]  Ferritin 353      [09-07-18 @ 12:05]  PTH -- (Ca 5.2)      [09-07-18 @ 12:05]   974  Vitamin D (25OH) 11.9      [09-07-18 @ 12:05]    HBsAb 3.5      [09-07-18 @ 20:18]  HBsAg Nonreact      [09-07-18 @ 20:18]  HCV 0.10, Nonreact      [09-07-18 @ 20:18]
Patient is a 18y old  Male who presents with a chief complaint of uremia (08 Sep 2018 14:34)      SUBJECTIVE / OVERNIGHT EVENTS:  Patient feels well. No new complaints.   No events over night.   ROS other wise negative.      T(C): 36.5 (09-14-18 @ 13:36), Max: 37 (09-14-18 @ 05:00)  HR: 98 (09-14-18 @ 13:36) (93 - 99)  BP: 143/85 (09-14-18 @ 13:36) (119/72 - 143/85)  RR: 17 (09-14-18 @ 13:36) (17 - 18)  SpO2: 99% (09-14-18 @ 13:36) (98% - 100%)      MEDICATIONS  (STANDING):  amLODIPine   Tablet 10 milliGRAM(s) Oral daily  calcium carbonate 1250 mG  + Vitamin D (OsCal 500 + D) 1 Tablet(s) Oral daily  epoetin charlie Injectable 8000 Unit(s) IV Push <User Schedule>  ferrous    sulfate 325 milliGRAM(s) Oral daily  predniSONE   Tablet 5 milliGRAM(s) Oral daily  sevelamer hydrochloride 1600 milliGRAM(s) Oral three times a day  tacrolimus 6 milliGRAM(s) Oral every 12 hours    MEDICATIONS  (PRN):      PHYSICAL EXAM:  GENERAL: NAD, well-developed  HEAD:  Atraumatic, Normocephalic  EYES: EOMI, conjunctiva and sclera clear  NECK: Supple, No JVD  CHEST/LUNG: Clear to auscultation bilaterally; No wheeze  HEART: Regular rate and rhythm; No murmurs, rubs, or gallops  ABDOMEN: Soft, Nontender, Nondistended; Bowel sounds present  EXTREMITIES:  2+ Peripheral Pulses, No clubbing, cyanosis, or edema, LUE fistula  PSYCH: AAOx3  NEUROLOGY: non-focal  SKIN: No rashes or lesions                                              10.3   9.8   )-----------( 233      ( 14 Sep 2018 13:01 )             28.9               140|101|43<87  4.2|22|9.27  7.9,--,--  09-14 @ 06:04      RADIOLOGY & ADDITIONAL TESTS:    Imaging Personally Reviewed:    Consultant(s) Notes Reviewed:      Care Discussed with Consultants/Other Providers:
Patient is a 18y old  Male who presents with a chief complaint of uremia (08 Sep 2018 14:34)      SUBJECTIVE / OVERNIGHT EVENTS:  Patient feels well. No new complaints.   No events over night.   ROS other wise negative.      T(C): 36.8 (09-12-18 @ 21:07), Max: 37 (09-12-18 @ 12:45)  HR: 84 (09-12-18 @ 21:07) (84 - 103)  BP: 116/74 (09-12-18 @ 21:07) (116/74 - 138/85)  RR: 18 (09-12-18 @ 21:07) (18 - 20)  SpO2: 97% (09-12-18 @ 21:07) (96% - 97%)    MEDICATIONS  (STANDING):  amLODIPine   Tablet 10 milliGRAM(s) Oral daily  calcium carbonate 1250 mG  + Vitamin D (OsCal 500 + D) 1 Tablet(s) Oral daily  epoetin charlie Injectable 8000 Unit(s) IV Push <User Schedule>  ferrous    sulfate 325 milliGRAM(s) Oral daily  predniSONE   Tablet 5 milliGRAM(s) Oral daily  sevelamer hydrochloride 1600 milliGRAM(s) Oral three times a day  tacrolimus 6 milliGRAM(s) Oral every 12 hours    MEDICATIONS  (PRN):  PHYSICAL EXAM:  GENERAL: NAD, well-developed  HEAD:  Atraumatic, Normocephalic  EYES: EOMI, conjunctiva and sclera clear  NECK: Supple, No JVD  CHEST/LUNG: Clear to auscultation bilaterally; No wheeze  HEART: Regular rate and rhythm; No murmurs, rubs, or gallops  ABDOMEN: Soft, Nontender, Nondistended; Bowel sounds present  EXTREMITIES:  2+ Peripheral Pulses, No clubbing, cyanosis, or edema, LUE fistula  PSYCH: AAOx3  NEUROLOGY: non-focal  SKIN: No rashes or lesions                                                          8.1    10.92 )-----------( 193      ( 12 Sep 2018 07:34 )             23.9               139|98|46<94  3.2|22|10.32  7.4,1.7,5.1  09-12 @ 06:03    RADIOLOGY & ADDITIONAL TESTS:    Imaging Personally Reviewed:    Consultant(s) Notes Reviewed:      Care Discussed with Consultants/Other Providers:
reports sober since discharge from hospital until day prior to admission

## 2023-05-08 ENCOUNTER — APPOINTMENT (OUTPATIENT)
Dept: NEPHROLOGY | Facility: CLINIC | Age: 23
End: 2023-05-08
Payer: MEDICAID

## 2023-05-08 VITALS
HEIGHT: 65 IN | TEMPERATURE: 97.3 F | BODY MASS INDEX: 32.82 KG/M2 | SYSTOLIC BLOOD PRESSURE: 128 MMHG | OXYGEN SATURATION: 97 % | WEIGHT: 197 LBS | RESPIRATION RATE: 16 BRPM | DIASTOLIC BLOOD PRESSURE: 86 MMHG | HEART RATE: 83 BPM

## 2023-05-08 PROCEDURE — 99214 OFFICE O/P EST MOD 30 MIN: CPT

## 2023-05-08 RX ORDER — ERGOCALCIFEROL 1.25 MG/1
1.25 MG CAPSULE, LIQUID FILLED ORAL
Qty: 8 | Refills: 0 | Status: DISCONTINUED | COMMUNITY
Start: 2021-12-16 | End: 2023-05-08

## 2023-05-08 NOTE — ASSESSMENT
[FreeTextEntry1] : 1.  Renal transplant - pt s/p second DDRT both at [facility].  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   IVIg given for DSA chronic [facility] risk.  Increased MMF and stopped IVIg. Renal function has been stable. \par 2.  Immunosuppression - Pt currently on tacrolimus, MMF 500mgs BID and prednisone 5mgs daily.  Pt has history of high DSA.  Has decreasing amounts of proteinuria.  \par 3.  HTN - well controlled.  Now not on amlodipine.  \par \par Will bring pt back for f/u in 3 months. \par

## 2023-05-08 NOTE — HISTORY OF PRESENT ILLNESS
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  First kidney failed in .  Pt went on dialysis for about 1 year then received a DDRT 2019 at [facility].  Early biopsies revealed some PTC and inflammation, he was given thymoglobulin and plasma exchange.  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  Pt follows with Dr. Archer.  Has been doing well. 2019 pt has labs, tacro was 4.8 and creatinine was 0.74mg/dl.  Pt may have had Covid when pandemic started.  Pt denies diabetes, has HTN.  Pt is currently on prograf and prednisone.  MMF was held due to BK virus.  BK peaked at almost 50,000 but trended down below 5000 recently.  He is receiving IVIg monthly at [facility].  Lives in [place].  \par \par For his first transplant he received thymoglobulin induction and had been maintained on prograf, cellcept and prednisone. He underwent a kidney biopsy in  and another one in  which revealed moderate IFTA/vascular changes consistent with chronic rejection versus hypertensive changes.  \par \par Lives with parents, single.  No smoking or alcohol abuse.  Currently working in a school as a .   [de-identified] : Last BK pcr very low level - 90.  Works at [employer].  Gained weight but started exercising.

## 2023-05-09 ENCOUNTER — NON-APPOINTMENT (OUTPATIENT)
Age: 23
End: 2023-05-09

## 2023-05-09 LAB
ALBUMIN SERPL ELPH-MCNC: 4.9 G/DL
ALP BLD-CCNC: 104 U/L
ALT SERPL-CCNC: 15 U/L
ANION GAP SERPL CALC-SCNC: 12 MMOL/L
APPEARANCE: CLEAR
AST SERPL-CCNC: 18 U/L
BACTERIA: NEGATIVE /HPF
BASOPHILS # BLD AUTO: 0.04 K/UL
BASOPHILS NFR BLD AUTO: 0.6 %
BILIRUB SERPL-MCNC: 1.3 MG/DL
BILIRUBIN URINE: NEGATIVE
BLOOD URINE: ABNORMAL
BUN SERPL-MCNC: 26 MG/DL
CALCIUM SERPL-MCNC: 10.7 MG/DL
CAST: 0 /LPF
CHLORIDE SERPL-SCNC: 103 MMOL/L
CMV DNA SPEC QL NAA+PROBE: NOT DETECTED IU/ML
CMVPCR LOG: NOT DETECTED LOG10IU/ML
CO2 SERPL-SCNC: 24 MMOL/L
COLOR: YELLOW
CREAT SERPL-MCNC: 1.03 MG/DL
CREAT SPEC-SCNC: 98 MG/DL
CREAT/PROT UR: 0.3 RATIO
EGFR: 104 ML/MIN/1.73M2
EOSINOPHIL # BLD AUTO: 0.1 K/UL
EOSINOPHIL NFR BLD AUTO: 1.4 %
EPITHELIAL CELLS: 1 /HPF
ESTIMATED AVERAGE GLUCOSE: 114 MG/DL
GLUCOSE QUALITATIVE U: NEGATIVE MG/DL
GLUCOSE SERPL-MCNC: 110 MG/DL
HBA1C MFR BLD HPLC: 5.6 %
HCT VFR BLD CALC: 43.5 %
HGB BLD-MCNC: 14.2 G/DL
IMM GRANULOCYTES NFR BLD AUTO: 0.1 %
KETONES URINE: NEGATIVE MG/DL
LEUKOCYTE ESTERASE URINE: NEGATIVE
LYMPHOCYTES # BLD AUTO: 2.44 K/UL
LYMPHOCYTES NFR BLD AUTO: 34.6 %
MAGNESIUM SERPL-MCNC: 1.5 MG/DL
MAN DIFF?: NORMAL
MCHC RBC-ENTMCNC: 30 PG
MCHC RBC-ENTMCNC: 32.6 GM/DL
MCV RBC AUTO: 92 FL
MICROSCOPIC-UA: NORMAL
MONOCYTES # BLD AUTO: 0.65 K/UL
MONOCYTES NFR BLD AUTO: 9.2 %
NEUTROPHILS # BLD AUTO: 3.82 K/UL
NEUTROPHILS NFR BLD AUTO: 54.1 %
NITRITE URINE: NEGATIVE
PH URINE: 6
PHOSPHATE SERPL-MCNC: 2.2 MG/DL
PLATELET # BLD AUTO: 236 K/UL
POTASSIUM SERPL-SCNC: 4.4 MMOL/L
PROT SERPL-MCNC: 7.9 G/DL
PROT UR-MCNC: 26 MG/DL
PROTEIN URINE: 30 MG/DL
RBC # BLD: 4.73 M/UL
RBC # FLD: 12.8 %
RED BLOOD CELLS URINE: 3 /HPF
REVIEW: NORMAL
SODIUM SERPL-SCNC: 139 MMOL/L
SPECIFIC GRAVITY URINE: 1.02
TACROLIMUS SERPL-MCNC: 4.6 NG/ML
URATE SERPL-MCNC: 8.2 MG/DL
UROBILINOGEN URINE: 0.2 MG/DL
WBC # FLD AUTO: 7.06 K/UL
WHITE BLOOD CELLS URINE: 0 /HPF

## 2023-05-12 LAB — BKV DNA SPEC QL NAA+PROBE: ABNORMAL IU/ML

## 2023-06-14 ENCOUNTER — EMERGENCY (EMERGENCY)
Facility: HOSPITAL | Age: 23
LOS: 0 days | Discharge: HOME | End: 2023-06-14
Attending: STUDENT IN AN ORGANIZED HEALTH CARE EDUCATION/TRAINING PROGRAM
Payer: MEDICAID

## 2023-06-14 VITALS
RESPIRATION RATE: 18 BRPM | DIASTOLIC BLOOD PRESSURE: 80 MMHG | OXYGEN SATURATION: 97 % | TEMPERATURE: 103 F | WEIGHT: 197.98 LBS | SYSTOLIC BLOOD PRESSURE: 151 MMHG | HEIGHT: 65 IN | HEART RATE: 118 BPM

## 2023-06-14 VITALS
OXYGEN SATURATION: 100 % | HEART RATE: 123 BPM | TEMPERATURE: 103 F | RESPIRATION RATE: 18 BRPM | SYSTOLIC BLOOD PRESSURE: 127 MMHG | DIASTOLIC BLOOD PRESSURE: 76 MMHG

## 2023-06-14 DIAGNOSIS — Z20.822 CONTACT WITH AND (SUSPECTED) EXPOSURE TO COVID-19: ICD-10-CM

## 2023-06-14 DIAGNOSIS — Z94.0 KIDNEY TRANSPLANT STATUS: Chronic | ICD-10-CM

## 2023-06-14 DIAGNOSIS — R05.9 COUGH, UNSPECIFIED: ICD-10-CM

## 2023-06-14 DIAGNOSIS — T86.12 KIDNEY TRANSPLANT FAILURE: Chronic | ICD-10-CM

## 2023-06-14 DIAGNOSIS — Z94.0 KIDNEY TRANSPLANT STATUS: ICD-10-CM

## 2023-06-14 DIAGNOSIS — R50.9 FEVER, UNSPECIFIED: ICD-10-CM

## 2023-06-14 DIAGNOSIS — J02.9 ACUTE PHARYNGITIS, UNSPECIFIED: ICD-10-CM

## 2023-06-14 DIAGNOSIS — Z86.2 PERSONAL HISTORY OF DISEASES OF THE BLOOD AND BLOOD-FORMING ORGANS AND CERTAIN DISORDERS INVOLVING THE IMMUNE MECHANISM: ICD-10-CM

## 2023-06-14 DIAGNOSIS — I10 ESSENTIAL (PRIMARY) HYPERTENSION: ICD-10-CM

## 2023-06-14 DIAGNOSIS — J18.9 PNEUMONIA, UNSPECIFIED ORGANISM: ICD-10-CM

## 2023-06-14 DIAGNOSIS — J18.1 LOBAR PNEUMONIA, UNSPECIFIED ORGANISM: ICD-10-CM

## 2023-06-14 LAB
ADD ON TEST-SPECIMEN IN LAB: SIGNIFICANT CHANGE UP
ALBUMIN SERPL ELPH-MCNC: 3.9 G/DL — SIGNIFICANT CHANGE UP (ref 3.3–5)
ALP SERPL-CCNC: 100 U/L — SIGNIFICANT CHANGE UP (ref 40–120)
ALT FLD-CCNC: 17 U/L — SIGNIFICANT CHANGE UP (ref 12–78)
ANION GAP SERPL CALC-SCNC: 4 MMOL/L — LOW (ref 5–17)
APPEARANCE UR: CLEAR — SIGNIFICANT CHANGE UP
AST SERPL-CCNC: 9 U/L — LOW (ref 15–37)
BACTERIA # UR AUTO: ABNORMAL
BASOPHILS # BLD AUTO: 0.04 K/UL — SIGNIFICANT CHANGE UP (ref 0–0.2)
BASOPHILS NFR BLD AUTO: 0.2 % — SIGNIFICANT CHANGE UP (ref 0–2)
BILIRUB SERPL-MCNC: 0.8 MG/DL — SIGNIFICANT CHANGE UP (ref 0.2–1.2)
BILIRUB UR-MCNC: NEGATIVE — SIGNIFICANT CHANGE UP
BUN SERPL-MCNC: 18 MG/DL — SIGNIFICANT CHANGE UP (ref 7–23)
CALCIUM SERPL-MCNC: 9.7 MG/DL — SIGNIFICANT CHANGE UP (ref 8.5–10.1)
CHLORIDE SERPL-SCNC: 105 MMOL/L — SIGNIFICANT CHANGE UP (ref 96–108)
CO2 SERPL-SCNC: 28 MMOL/L — SIGNIFICANT CHANGE UP (ref 22–31)
COLOR SPEC: YELLOW — SIGNIFICANT CHANGE UP
COMMENT - URINE: SIGNIFICANT CHANGE UP
CREAT SERPL-MCNC: 1.32 MG/DL — HIGH (ref 0.5–1.3)
DIFF PNL FLD: ABNORMAL
EBV EA AB SER IA-ACNC: <5 U/ML — SIGNIFICANT CHANGE UP
EBV EA AB TITR SER IF: POSITIVE
EBV EA IGG SER-ACNC: NEGATIVE — SIGNIFICANT CHANGE UP
EBV NA IGG SER IA-ACNC: >600 U/ML — HIGH
EBV PATRN SPEC IB-IMP: SIGNIFICANT CHANGE UP
EBV VCA IGG AVIDITY SER QL IA: POSITIVE
EBV VCA IGM SER IA-ACNC: <10 U/ML — SIGNIFICANT CHANGE UP
EBV VCA IGM SER IA-ACNC: >750 U/ML — HIGH
EBV VCA IGM TITR FLD: NEGATIVE — SIGNIFICANT CHANGE UP
EGFR: 77 ML/MIN/1.73M2 — SIGNIFICANT CHANGE UP
EOSINOPHIL # BLD AUTO: 0.1 K/UL — SIGNIFICANT CHANGE UP (ref 0–0.5)
EOSINOPHIL NFR BLD AUTO: 0.5 % — SIGNIFICANT CHANGE UP (ref 0–6)
EPI CELLS # UR: NEGATIVE — SIGNIFICANT CHANGE UP
GLUCOSE SERPL-MCNC: 123 MG/DL — HIGH (ref 70–99)
GLUCOSE UR QL: NEGATIVE — SIGNIFICANT CHANGE UP
HCT VFR BLD CALC: 43.6 % — SIGNIFICANT CHANGE UP (ref 39–50)
HETEROPH AB TITR SER AGGL: NEGATIVE — SIGNIFICANT CHANGE UP
HGB BLD-MCNC: 14.2 G/DL — SIGNIFICANT CHANGE UP (ref 13–17)
HYALINE CASTS # UR AUTO: NEGATIVE /LPF — SIGNIFICANT CHANGE UP
IMM GRANULOCYTES NFR BLD AUTO: 0.6 % — SIGNIFICANT CHANGE UP (ref 0–0.9)
KETONES UR-MCNC: NEGATIVE — SIGNIFICANT CHANGE UP
LACTATE SERPL-SCNC: 1 MMOL/L — SIGNIFICANT CHANGE UP (ref 0.7–2)
LEUKOCYTE ESTERASE UR-ACNC: NEGATIVE — SIGNIFICANT CHANGE UP
LYMPHOCYTES # BLD AUTO: 1.82 K/UL — SIGNIFICANT CHANGE UP (ref 1–3.3)
LYMPHOCYTES # BLD AUTO: 9.8 % — LOW (ref 13–44)
MCHC RBC-ENTMCNC: 29.3 PG — SIGNIFICANT CHANGE UP (ref 27–34)
MCHC RBC-ENTMCNC: 32.6 GM/DL — SIGNIFICANT CHANGE UP (ref 32–36)
MCV RBC AUTO: 89.9 FL — SIGNIFICANT CHANGE UP (ref 80–100)
MONOCYTES # BLD AUTO: 1.41 K/UL — HIGH (ref 0–0.9)
MONOCYTES NFR BLD AUTO: 7.6 % — SIGNIFICANT CHANGE UP (ref 2–14)
NEUTROPHILS # BLD AUTO: 15.17 K/UL — HIGH (ref 1.8–7.4)
NEUTROPHILS NFR BLD AUTO: 81.3 % — HIGH (ref 43–77)
NITRITE UR-MCNC: NEGATIVE — SIGNIFICANT CHANGE UP
PH UR: 5 — SIGNIFICANT CHANGE UP (ref 5–8)
PLATELET # BLD AUTO: 211 K/UL — SIGNIFICANT CHANGE UP (ref 150–400)
POTASSIUM SERPL-MCNC: 3.7 MMOL/L — SIGNIFICANT CHANGE UP (ref 3.5–5.3)
POTASSIUM SERPL-SCNC: 3.7 MMOL/L — SIGNIFICANT CHANGE UP (ref 3.5–5.3)
PROT SERPL-MCNC: 8.7 GM/DL — HIGH (ref 6–8.3)
PROT UR-MCNC: 100
RAPID RVP RESULT: SIGNIFICANT CHANGE UP
RBC # BLD: 4.85 M/UL — SIGNIFICANT CHANGE UP (ref 4.2–5.8)
RBC # FLD: 12.6 % — SIGNIFICANT CHANGE UP (ref 10.3–14.5)
RBC CASTS # UR COMP ASSIST: ABNORMAL /HPF (ref 0–4)
S PYO AG SPEC QL IA: NEGATIVE — SIGNIFICANT CHANGE UP
SARS-COV-2 RNA SPEC QL NAA+PROBE: SIGNIFICANT CHANGE UP
SODIUM SERPL-SCNC: 137 MMOL/L — SIGNIFICANT CHANGE UP (ref 135–145)
SP GR SPEC: 1.01 — SIGNIFICANT CHANGE UP (ref 1.01–1.02)
UROBILINOGEN FLD QL: NEGATIVE — SIGNIFICANT CHANGE UP
WBC # BLD: 18.65 K/UL — HIGH (ref 3.8–10.5)
WBC # FLD AUTO: 18.65 K/UL — HIGH (ref 3.8–10.5)
WBC UR QL: NEGATIVE /HPF — SIGNIFICANT CHANGE UP (ref 0–5)

## 2023-06-14 PROCEDURE — 0225U NFCT DS DNA&RNA 21 SARSCOV2: CPT

## 2023-06-14 PROCEDURE — 87040 BLOOD CULTURE FOR BACTERIA: CPT

## 2023-06-14 PROCEDURE — 86664 EPSTEIN-BARR NUCLEAR ANTIGEN: CPT

## 2023-06-14 PROCEDURE — 80053 COMPREHEN METABOLIC PANEL: CPT

## 2023-06-14 PROCEDURE — 99285 EMERGENCY DEPT VISIT HI MDM: CPT

## 2023-06-14 PROCEDURE — 83605 ASSAY OF LACTIC ACID: CPT

## 2023-06-14 PROCEDURE — 85025 COMPLETE CBC W/AUTO DIFF WBC: CPT

## 2023-06-14 PROCEDURE — 87081 CULTURE SCREEN ONLY: CPT

## 2023-06-14 PROCEDURE — 36415 COLL VENOUS BLD VENIPUNCTURE: CPT

## 2023-06-14 PROCEDURE — 87086 URINE CULTURE/COLONY COUNT: CPT

## 2023-06-14 PROCEDURE — 99283 EMERGENCY DEPT VISIT LOW MDM: CPT | Mod: 25

## 2023-06-14 PROCEDURE — 71045 X-RAY EXAM CHEST 1 VIEW: CPT | Mod: 26

## 2023-06-14 PROCEDURE — 87880 STREP A ASSAY W/OPTIC: CPT

## 2023-06-14 PROCEDURE — 86665 EPSTEIN-BARR CAPSID VCA: CPT

## 2023-06-14 PROCEDURE — 86663 EPSTEIN-BARR ANTIBODY: CPT

## 2023-06-14 PROCEDURE — 71045 X-RAY EXAM CHEST 1 VIEW: CPT

## 2023-06-14 PROCEDURE — 86308 HETEROPHILE ANTIBODY SCREEN: CPT

## 2023-06-14 PROCEDURE — 99284 EMERGENCY DEPT VISIT MOD MDM: CPT | Mod: 25

## 2023-06-14 PROCEDURE — 81001 URINALYSIS AUTO W/SCOPE: CPT

## 2023-06-14 RX ORDER — ACETAMINOPHEN 500 MG
650 TABLET ORAL ONCE
Refills: 0 | Status: COMPLETED | OUTPATIENT
Start: 2023-06-14 | End: 2023-06-14

## 2023-06-14 RX ORDER — SODIUM CHLORIDE 9 MG/ML
1000 INJECTION INTRAMUSCULAR; INTRAVENOUS; SUBCUTANEOUS ONCE
Refills: 0 | Status: COMPLETED | OUTPATIENT
Start: 2023-06-14 | End: 2023-06-14

## 2023-06-14 RX ORDER — AZITHROMYCIN 500 MG/1
500 TABLET, FILM COATED ORAL ONCE
Refills: 0 | Status: COMPLETED | OUTPATIENT
Start: 2023-06-14 | End: 2023-06-14

## 2023-06-14 RX ORDER — AZITHROMYCIN 500 MG/1
1 TABLET, FILM COATED ORAL
Qty: 7 | Refills: 0
Start: 2023-06-14 | End: 2023-06-20

## 2023-06-14 RX ADMIN — SODIUM CHLORIDE 1000 MILLILITER(S): 9 INJECTION INTRAMUSCULAR; INTRAVENOUS; SUBCUTANEOUS at 03:26

## 2023-06-14 RX ADMIN — Medication 1 TABLET(S): at 05:41

## 2023-06-14 RX ADMIN — Medication 650 MILLIGRAM(S): at 02:03

## 2023-06-14 RX ADMIN — AZITHROMYCIN 500 MILLIGRAM(S): 500 TABLET, FILM COATED ORAL at 05:41

## 2023-06-14 NOTE — ED ADULT NURSE NOTE - OBJECTIVE STATEMENT
22y male presents to the ED A/Ox4, c/o of sore throat. Pt reports PMH of kidney transplant 2 years ago, reports sore throat, fevers and chills over the past few days. Pt has been taking tylenol but is c/o of mild back discomfort. PT denies N/V/D. Compliant with medications.

## 2023-06-14 NOTE — ED ADULT NURSE NOTE - HISTORY OF COVID-19 VACCINATION
NURSE NOTES:

Paged Dr. Blount regarding new condition of elevated WBC and mild fever. No new order 
yet. Will continue plan of care. Will carry out the order as soon as possible. Vaccine status unknown

## 2023-06-14 NOTE — ED PROVIDER NOTE - NSFOLLOWUPINSTRUCTIONS_ED_ALL_ED_FT
[facility] General Internal Medicine  General Internal Medicine  [address] [place] [address]  Phone: [phone]  Fax:     [facility]  Internal Medicine  [address] [place] [address]  Phone: [phone]  Fax: [phone] Syndrome    WHAT YOU NEED TO KNOW:    Viral syndrome is a term used for symptoms of an infection caused by a virus. Viruses are spread easily from person to person through the air and on shared items.    DISCHARGE INSTRUCTIONS:    Call your local emergency number (911 in the ) or have someone else call if:    You have a seizure.    You cannot be woken.    You have chest pain or trouble breathing.  Seek care immediately if:    You have a stiff neck, a bad headache, and sensitivity to light.    You feel weak, dizzy, or confused.    You stop urinating or urinate a lot less than usual.    You cough up blood or thick yellow or green mucus.    You have severe abdominal pain or your abdomen is larger than usual.  Call your doctor if:    Your symptoms do not get better with treatment or get worse after 3 days.    You have a rash or ear pain.    You have burning when you urinate.    You have questions or concerns about your condition or care.  Medicines: You may need any of the following:    Acetaminophen decreases pain and fever. It is available without a doctor's order. Ask how much to take and how often to take it. Follow directions. Read the labels of all other medicines you are using to see if they also contain acetaminophen, or ask your doctor or pharmacist. Acetaminophen can cause liver damage if not taken correctly. Do not use more than 4 grams (4,000 milligrams) total of acetaminophen in one day.    NSAIDs, such as ibuprofen, help decrease swelling, pain, and fever. NSAIDs can cause stomach bleeding or kidney problems in certain people. If you take blood thinner medicine, always ask your healthcare provider if NSAIDs are safe for you. Always read the medicine label and follow directions.    Cold medicine helps decrease swelling, control a cough, and relieve chest or nasal congestion.    Saline nasal spray helps decrease nasal congestion.    Take your medicine as directed. Contact your healthcare provider if you think your medicine is not helping or if you have side effects. Tell him of her if you are allergic to any medicine. Keep a list of the medicines, vitamins, and herbs you take. Include the amounts, and when and why you take them. Bring the list or the pill bottles to follow-up visits. Carry your medicine list with you in case of an emergency.  Manage your symptoms:    Drink liquids as directed to prevent dehydration. Ask how much liquid to drink each day and which liquids are best for you. Ask if you should drink an oral rehydration solution (ORS). An ORS has the right amounts of water, salts, and sugar you need to replace body fluids. This may help prevent dehydration caused by vomiting or diarrhea. Do not drink liquids with caffeine. Liquids with caffeine can make dehydration worse.    Get plenty of rest to help your body heal. Take naps throughout the day. Ask your healthcare provider when you can return to work and your normal activities.    Use a cool mist humidifier to help you breathe easier. Ask your healthcare provider how to use a cool mist humidifier.    Eat honey or use cough drops for a sore throat. Cough drops are available without a doctor's order. Follow directions for taking cough drops.    Do not smoke or be close to anyone who is smoking. Nicotine and other chemicals in cigarettes and cigars can cause lung damage. Smoking can also delay healing. Ask your healthcare provider for information if you currently smoke and need help to quit. E-cigarettes or smokeless tobacco still contain nicotine. Talk to your healthcare provider before you use these products.  Prevent the spread of germs:      Wash your hands often. Wash your hands several times each day. Wash after you use the bathroom, change a child's diaper, and before you prepare or eat food. Use soap and water every time. Rub your soapy hands together, lacing your fingers. Wash the front and back of your hands, and in between your fingers. Use the fingers of one hand to scrub under the fingernails of the other hand. Wash for at least 20 seconds. Rinse with warm, running water for several seconds. Then dry your hands with a clean towel or paper towel. Use hand  that contains alcohol if soap and water are not available. Do not touch your eyes, nose, or mouth without washing your hands first.  Handwashing      Cover a sneeze or cough. Use a tissue that covers your mouth and nose. Throw the tissue away in a trash can right away. Use the bend of your arm if a tissue is not available. Wash your hands well with soap and water or use a hand .    Stay away from others while you are sick. Avoid crowds as much as possible.    Ask about vaccines you may need. Talk to your healthcare provider about your vaccine history. He or she will tell you which vaccines you need, and when to get them.  Get the influenza (flu) vaccine as soon as recommended each year. The flu vaccine is available starting in September or October. Flu viruses change, so it is important to get a flu vaccine every year.    Get the pneumonia vaccine if recommended. This vaccine is usually recommended every 5 years. Your provider will tell you when to get this vaccine, if needed.  Follow up with your doctor as directed: Write down your questions so you remember to ask them during your visits.    Síndrome viral    LO QUE NECESITA SABER:    El síndrome viral es un término usado para los síntomas de marcella infección causada por un virus. Los virus son propagados fácilmente de marcella persona a otra a través del aire y mediante los objetos que se comparten.    INSTRUCCIONES SOBRE EL PETAR HOSPITALARIA:    Llame al número local de emergencias (911 en los [place]), o pídale a alguien que llame si:    Usted sufre marcella convulsión.    No es posible despertarlo.    Usted tiene dolor torácico y dificultad para respirar.  Busque atención médica de inmediato si:    Usted tiene rigidez en el rosangela, dolor de kerwin intenso y sensibilidad a la debora.    Usted se siente débil, mareado o confundido.    Usted tootie de orinar u orina menos de lo habitual.    Usted tose dillon o marcella mucosidad espesa amarilla o adeola.    Usted tiene dolor abdominal severo o alejo abdomen está más kristi de lo habitual.  Llame a alejo médico si:    Wendy síntomas no mejoran con el tratamiento o empeoran después de 3 días.    Usted tiene salpullido o dolor de oído.    Usted siente ardor al orinar.    Usted tiene preguntas o inquietudes acerca de alejo condición o cuidado.  Medicamentos:Es posible que usted necesite alguno de los siguientes:    Acetaminofénalivia el dolor y baja la fiebre. Está disponible sin receta médica. Pregunte la cantidad y la frecuencia con que debe tomarlos. Siga las indicaciones. Estuardo las etiquetas de todos los demás medicamentos que esté usando para saber si también contienen acetaminofén, o pregunte a alejo médico o farmacéutico. El acetaminofén puede causar daño en el hígado cuando no se justin de forma correcta. No use más de 4 gramos (4000 miligramos) en total de acetaminofeno en un día.    Los CAROLYN,jelena el ibuprofeno, ayudan a disminuir la inflamación, el dolor y la fiebre. Los CAROLYN pueden causar sangrado estomacal o problemas renales en ciertas personas. Si usted justin un medicamento anticoagulante, siempre pregúntele a alejo médico si los CAROLYN son seguros para usted. Siempre estuardo la etiqueta de lyndsey medicamento y siga las instrucciones.    El medicamento para el resfriadoayuda a disminuir la inflamación, a controlar la tos y a aliviar la congestión del pecho o nasal.    El rociador nasal de agua salinaayuda a disminuir la congestión nasal.    [place] wendy medicamentos jelena se le haya indicado.Consulte con alejo médico si usted yasir que alejo medicamento no le está ayudando o si presenta efectos secundarios. Infórmele si es alérgico a algún medicamento. Mantenga marcella lista actualizada de los medicamentos, las vitaminas y los productos herbales que justin. Incluya los siguientes datos de los medicamentos: cantidad, frecuencia y motivo de administración. Traiga con usted la lista o los envases de las píldoras a wendy citas de seguimiento. Lleve la lista de los medicamentos con usted en neetu de marcella emergencia.  El manejo de wendy síntomas:    [place] líquidos jelena se le indique para evitar marcella deshidratación.Pregunte cuánto líquido debe conner cada día y cuáles líquidos son los más adecuados para usted. Pregunte si usted debería conner marcella solución de rehidratación oral (SRO). Marcella SRO tiene las cantidades exactas de agua, sal y azúcar que usted necesita para reemplazar los líquidos corporales. [place] podría ayudarlo a evitar la deshidratación a causa del vómito y de la diarrea. No tome líquidos con cafeína. Los líquidos con cafeína pueden empeorar la deshidratación.    Descanse lo suficiente para ayudar a que alejo cuerpo sane.[place] siestas nereida el día. Pregunte a alejo médico cuándo puede regresar a alejo trabajo y a wendy actividades cotidianas.    Use un humidificador de natalia fría para respirar más fácilmente.Pregunte a alejo médico cómo usar un humidificador de vapor frío.    Coma miel o use pastillas para la tos para el dolor de garganta.Los caramelos para la tos están disponibles sin receta médica. Siga las indicaciones para conner los caramelos para la tos.    No fume ni esté cerca a alguien que esté fumando.La nicotina y otras sustancias químicas que contienen los cigarrillos y cigarros pueden dañar los pulmones. El fumar también puede retrasar la sanación. Pida información a alejo médico si usted actualmente fuma y necesita ayuda para dejar de fumar. Los cigarrillos electrónicos o el tabaco sin humo igualmente contienen nicotina. Consulte con alejo médico antes de utilizar estos productos.  Prevenga la propagación de gérmenes:      Lávese las cyril frecuentemente.Lávese las cyril varias veces al día. Lávese después de usar el baño, después de cambiar pañales y antes de preparar la comida o comer. Use siempre agua y jabón. Frótese las cyril enjabonadas, entrelazando los dedos. Lávese el frente y el dorso de las cyril, y entre los dedos. Use los dedos de marcella mano para restregar debajo de las uñas de la otra mano. Lávese nereida al menos 20 segundos. Enjuague con agua corriente caliente nereida varios segundos. Luego séquese las cyril con marcella toalla limpia o marcella toalla de papel. Puede usar un desinfectante para cyril que contenga alcohol, si no hay agua y jabón disponibles. No se toque los ojos, la nariz o la boca sin antes lavarse las cyril.  Lavado de cyril      Cúbrase al toser o estornudar.Use un pañuelo que cubra la boca y la nariz. Arroje el pañuelo a la basura de inmediato. Use el ángulo del brazo si no tiene un pañuelo disponible. Lávese las cyril con agua y jabón o use un desinfectante de cyril.    Manténgase alejado de los demás mientras esté enfermo.Evite las multitudes lo más que pueda.    Pregunte sobre las vacunas que pudiera necesitar.Hable con alejo médico sobre alejo historial de vacunación. Alejo médico le indicará qué vacunas necesita y cuándo recibirlas.  Vacúnese contra la influenza (gripe) tan pronto jelena se recomiende cada año.La vacuna antigripal se ofrece a partir de septiembre u octubre. Los virus de la gripe cambian, por lo que es importante vacunarse contra la gripe cada año.    Vacúnese contra la neumonía si se recomienda.Esta vacuna generalmente se recomienda cada 5 años. Alejo médico le indicará cuándo recibir esta vacuna, de ser necesaria.  Acuda a la consulta de control con alejo médico según las indicaciones:Anote wendy preguntas para que se acuerde de hacerlas nereida wendy visitas. Take azithromycin 1 time a day for the next 7 days.      Take Augmentin twice a day for the next 7 days.      Follow-up with primary care doctor.    Pneumonia    WHAT YOU NEED TO KNOW:    Pneumonia is an infection in your lungs caused by bacteria, viruses, fungi, or parasites. You can become infected if you come in contact with someone who is sick. You can get pneumonia if you recently had surgery or needed a ventilator to help you breathe. Pneumonia can also be caused by accidentally inhaling saliva or small pieces of food. Pneumonia may cause mild symptoms, or it can be severe and life-threatening.  The Lungs    DISCHARGE INSTRUCTIONS:    Seek care immediately if:    You cough up blood.    Your heart beats more than 100 beats in 1 minute.    You are very tired, confused, and cannot think clearly.    You have chest pain or trouble breathing.    Your lips or fingernails turn gray or blue.  Call your doctor if:    Your symptoms are the same or get worse 48 hours after you start antibiotics.    Your fever is not below 99°F (37.2°C) 48 hours after you start antibiotics.    You have a fever higher than 101°F (38.3°C).    You cannot eat, or you have loss of appetite, nausea, or are vomiting.    You have questions or concerns about your condition or care.  Medicines: You may need any of the following:    Antibiotics treat pneumonia caused by bacteria.    Acetaminophen decreases pain and fever. It is available without a doctor's order. Ask how much to take and how often to take it. Follow directions. Read the labels of all other medicines you are using to see if they also contain acetaminophen, or ask your doctor or pharmacist. Acetaminophen can cause liver damage if not taken correctly. Do not use more than 4 grams (4,000 milligrams) total of acetaminophen in one day.    NSAIDs, such as ibuprofen, help decrease swelling, pain, and fever. This medicine is available with or without a doctor's order. NSAIDs can cause stomach bleeding or kidney problems in certain people. If you take blood thinner medicine, always ask your healthcare provider if NSAIDs are safe for you. Always read the medicine label and follow directions.    Take your medicine as directed. Contact your healthcare provider if you think your medicine is not helping or if you have side effects. Tell him or her if you are allergic to any medicine. Keep a list of the medicines, vitamins, and herbs you take. Include the amounts, and when and why you take them. Bring the list or the pill bottles to follow-up visits. Carry your medicine list with you in case of an emergency.  Follow up with your doctor as directed: You will need to return for more tests. Write down your questions so you remember to ask them during your visits.    Manage your symptoms:    Rest as needed. Rest often throughout the day. Alternate times of activity with times of rest.    Drink liquids as directed. Ask how much liquid to drink each day and which liquids are best for you. Liquids help thin your mucus, which may make it easier for you to cough it up.    Do not smoke. Avoid secondhand smoke. Smoking increases your risk for pneumonia. Smoking also makes it harder for you to get better after you have had pneumonia. Ask your healthcare provider for information if you need help to quit smoking.    Limit alcohol. Women should limit alcohol to 1 drink a day. Men should limit alcohol to 2 drinks a day. A drink of alcohol is 12 ounces of beer, 5 ounces of wine, or 1½ ounces of liquor.    Use a cool mist humidifier. A humidifier will help increase air moisture in your home. This may make it easier for you to breathe and help decrease your cough.    Keep your head elevated. You may be able to breathe better if you lie down with the head of your bed up.  Prevent pneumonia:    Wash your hands often. Use soap and water every time you wash your hands. Rub your soapy hands together, lacing your fingers. Use the fingers of one hand to scrub under the nails of the other hand. Wash for at least 20 seconds. Rinse with warm, running water for several seconds. Then dry your hands with a clean towel or paper towel. Use hand  that contains alcohol if soap and water are not available. Do not touch your eyes, nose, or mouth without washing your hands first.  Handwashing      Cover a sneeze or cough. Use a tissue that covers your mouth and nose. Throw the tissue away in a trash can right away. Use the bend of your arm if a tissue is not available. Wash your hands well with soap and water or use a hand . Do not stand close to anyone who is sneezing or coughing.    Stay away from others until you are well. Do not go to work or other activities. Wait until your symptoms are gone or your healthcare provider says it is okay to return.    Ask about vaccines you may need. You may need a vaccine to help prevent pneumonia. Get an influenza (flu) vaccine every year as soon as recommended, usually in September or October. Flu viruses change, so it is important to get a yearly flu vaccine.    Neumonía    LO QUE NECESITA SABER:    La neumonía es marcella infección en wendy pulmones causada por bacterias, virus, hongos o parásitos. Usted puede contagiarse al entrar en contacto con marcella persona enferma. Puede contraer pulmonía si recientemente se sometió a marcella cirugía o necesitó un respirador para ayudarlo a respirar. La neumonía también puede ser causada al inhalar accidentalmente saliva o pequeños trozos de comida. La neumonía puede causar síntomas leves o puede ser severa y de peligro mortal.  Los pulmones    INSTRUCCIONES SOBRE EL PETAR HOSPITALARIA:    Busque atención médica de inmediato si:    Usted expectora dillon.    Alejo corazón late a más de 100 latidos por 1 minuto.    Usted está muy cansado, confundido y no puede pensar con claridad.    Usted tiene dolor torácico y dificultad para respirar.    Wendy labios o uñas de las cyril se tornan grises o azules.  Llame a alejo médico si:    Wendy síntomas siguen igual o empeoran en las 48 horas después de empezar con los antibióticos.    Alejo fiebre no está por debajo de los 99 °F (37.2 °C) en las 48 horas después de empezar los antibióticos.    Usted tiene fiebre que supera los 101°F (38.3°C).    Usted no puede comer o tiene pérdida del apetito, náuseas o vómitos.    Usted tiene preguntas o inquietudes acerca de alejo condición o cuidado.  Medicamentos:Es posible que usted necesite alguno de los siguientes:    Los antibióticospara tratar la neumonía causada por marcella bacteria.    Acetaminofénalivia el dolor y baja la fiebre. Está disponible sin receta médica. Pregunte la cantidad y la frecuencia con que debe tomarlos. Siga las indicaciones. Estuardo las etiquetas de todos los demás medicamentos que esté usando para saber si también contienen acetaminofén, o pregunte a alejo médico o farmacéutico. El acetaminofén puede causar daño en el hígado cuando no se justin de forma correcta. No use más de 4 gramos (4000 miligramos) en total de acetaminofeno en un día.    Los CAROLYN,jelena el ibuprofeno, ayudan a disminuir la inflamación, el dolor y la fiebre. [place] medicamento está disponible con o sin marcella receta médica. Los CAROLYN pueden causar sangrado estomacal o problemas renales en ciertas personas. Si usted justin un medicamento anticoagulante, siempre pregúntele a alejo médico si los CAROLYN son seguros para usted. Siempre estuardo la etiqueta de marissa medicamento y siga las instrucciones.    [place] wendy medicamentos jelena se le haya indicado.Consulte con alejo médico si usted yasir que alejo medicamento no le está ayudando o si presenta efectos secundarios. Infórmele si es alérgico a cualquier medicamento. Mantenga marcella lista actualizada de los medicamentos, las vitaminas y los productos herbales que justin. Incluya los siguientes datos de los medicamentos: cantidad, frecuencia y motivo de administración. Traiga con usted la lista o los envases de las píldoras a wendy citas de seguimiento. Lleve la lista de los medicamentos con usted en neetu de marcella emergencia.  Acuda a la consulta de control con alejo médico según las indicaciones:Usted necesitará regresar para que le realicen más exámenes. Anote wendy preguntas para que se acuerde de hacerlas nereida wendy visitas.    El manejo de wendy síntomas:    Descanse tanto jelena sea necesario.Descanse con frecuencia en el transcurso del día. Alterne las horas de actividad con horas de descanso.    [place] líquidos jelena se le haya indicado.Pregunte cuánto líquido debe conner cada día y cuáles líquidos son los más adecuados para usted. Los líquidos ayudan a disolver la mucosidad, lo cual le facilita la expectoración de secreciones.    No fume.Evite el humo del cigarrillo cuando alguien más está fumando. El tabaquismo aumenta alejo riesgo de contraer neumonía. El fumar impide la pronta recuperación después que usted ha tenido neumonía. [place] a alejo médico más información si usted necesita ayuda para dejar de fumar.    Limite el consumo de alcohol.Las mujeres deberían limitar el consumo de alcohol a 1 bebida por día. Los hombres deberían limitar el consumo de alcohol a 2 tragos al día. Un trago equivale a 12 onzas de cerveza, 5 onzas de vino o 1 onza y ½ de licor.    Use un humidificador de vapor frío.El humidificador le ayudará a aumentar la humedad del aire en alejo hogar. [place] podría ayudarle a respirar más fácilmente y al mismo tiempo disminuir la tos.    Mantenga alejo kerwin elevada.Podría respirar mejor si se acuesta con la cabecera de alejo cama para arriba.  Prevenga la neumonía:    Lávese las cyril frecuentemente.Use agua y jabón cada vez que se lave las cyril. Frótese las cyril enjabonadas, entrelazando los dedos. Use los dedos de marcella mano para restregar debajo de las uñas de la otra mano. Lávese nereida al menos 20 segundos. Enjuague con agua corriente caliente nereida varios segundos. Luego séquese las cyril con marcella toalla limpia o marcella toalla de papel. Puede usar un desinfectante para cyril que contenga alcohol, si no hay agua y jabón disponibles. No se toque los ojos, la nariz o la boca sin antes lavarse las cyril.  Lavado de cyril      Cúbrase al toser o estornudar.Use un pañuelo que cubra la boca y la nariz. Arroje el pañuelo a la basura de inmediato. Use el ángulo del brazo si no tiene un pañuelo disponible. Lávese las cyril con agua y jabón o use un desinfectante de cyril. No se pare cerca de nadie que esté estornudando o tosiendo.    Manténgase alejado de los demás hasta que esté blayne.No vaya a trabajar ni a otras actividades. Espere hasta que los síntomas desaparezcan o hasta que alejo médico le diga que puede volver.    Pregunte sobre las vacunas que pudiera necesitar.Es posible que usted necesite recibir marcella vacuna que sirve para prevenir la neumonía. Vacúnese contra la influenza (gripe) todos los años tan pronto jelena se recomiende, normalmente en septiembre u octubre. Los virus de la gripe cambian, por lo que es importante vacunarse contra la gripe cada año.

## 2023-06-14 NOTE — ED ADULT TRIAGE NOTE - CHIEF COMPLAINT QUOTE
pt complaining of sore throat, fever, headache and fatigue x2 days.  positive sick contacts around patient.

## 2023-06-14 NOTE — ED PROVIDER NOTE - NSICDXPASTMEDICALHX_GEN_ALL_CORE_FT
PAST MEDICAL HISTORY:  Anemia     Atrophic kidney     Hypertension     Kidney Transplant nov 2007 (due to renal dysplasia)

## 2023-06-14 NOTE — ED ADULT NURSE NOTE - NSFALLUNIVINTERV_ED_ALL_ED
Bed/Stretcher in lowest position, wheels locked, appropriate side rails in place/Call bell, personal items and telephone in reach/Instruct patient to call for assistance before getting out of bed/chair/stretcher/Non-slip footwear applied when patient is off stretcher/[place] to call system/Physically safe environment - no spills, clutter or unnecessary equipment/Purposeful proactive rounding/Room/bathroom lighting operational, light cord in reach

## 2023-06-14 NOTE — ED PROVIDER NOTE - PATIENT PORTAL LINK FT
You can access the FollowMyHealth Patient Portal offered by [facility] by registering at the following website: [email or web address]. By joining [facility]’s FollowMyHealth portal, you will also be able to view your health information using other applications (apps) compatible with our system.

## 2023-06-14 NOTE — ED PROVIDER NOTE - OBJECTIVE STATEMENT
22-year-old male history of kidney transplant 2 years ago presents the ED with 4 days of sore throat cough fevers and chills.  Multiple sick contacts at home.  Has been taking Tylenol intermittently for fevers.  Last dose at 7 AM.  No nausea vomiting diarrhea.  Urinating normally.  No abdominal pain no lower back pain.  No chest pain.

## 2023-06-14 NOTE — ED PROVIDER NOTE - CLINICAL SUMMARY MEDICAL DECISION MAKING FREE TEXT BOX
Adult male comes in with viral symptoms for 4 days.  Fever sore throat cough.  Sick contacts at home.  Here vitals are normal besides fever.  On exam no signs of bacterial infection.  Given immunocompromise status he is a kidney transplant recipient patient will check for strep check blood work kidney function fever control and reassess.  If blood work is normal including kidney function and strep is negative will DC with instructions on how to treat viral syndrome.  We will also get chest x-ray rule out pneumonia.

## 2023-06-15 LAB
CULTURE RESULTS: SIGNIFICANT CHANGE UP
SPECIMEN SOURCE: SIGNIFICANT CHANGE UP

## 2023-06-19 LAB
CULTURE RESULTS: SIGNIFICANT CHANGE UP
CULTURE RESULTS: SIGNIFICANT CHANGE UP
SPECIMEN SOURCE: SIGNIFICANT CHANGE UP
SPECIMEN SOURCE: SIGNIFICANT CHANGE UP

## 2023-07-18 ENCOUNTER — NON-APPOINTMENT (OUTPATIENT)
Age: 23
End: 2023-07-18

## 2023-07-18 ENCOUNTER — APPOINTMENT (OUTPATIENT)
Dept: NEPHROLOGY | Facility: CLINIC | Age: 23
End: 2023-07-18
Payer: MEDICAID

## 2023-07-18 VITALS
SYSTOLIC BLOOD PRESSURE: 132 MMHG | DIASTOLIC BLOOD PRESSURE: 74 MMHG | BODY MASS INDEX: 31.99 KG/M2 | OXYGEN SATURATION: 99 % | RESPIRATION RATE: 16 BRPM | WEIGHT: 192 LBS | HEART RATE: 78 BPM | HEIGHT: 65 IN

## 2023-07-18 DIAGNOSIS — D84.9 IMMUNODEFICIENCY, UNSPECIFIED: ICD-10-CM

## 2023-07-18 LAB
ALBUMIN SERPL ELPH-MCNC: 4.8 G/DL
ALP BLD-CCNC: 81 U/L
ALT SERPL-CCNC: 13 U/L
ANION GAP SERPL CALC-SCNC: 10 MMOL/L
APPEARANCE: CLEAR
AST SERPL-CCNC: 18 U/L
BACTERIA: NEGATIVE /HPF
BILIRUB SERPL-MCNC: 1 MG/DL
BILIRUBIN URINE: NEGATIVE
BLOOD URINE: ABNORMAL
BUN SERPL-MCNC: 24 MG/DL
CALCIUM SERPL-MCNC: 10.4 MG/DL
CALCIUM SERPL-MCNC: 10.4 MG/DL
CAST: 1 /LPF
CHLORIDE SERPL-SCNC: 106 MMOL/L
CO2 SERPL-SCNC: 23 MMOL/L
COLOR: YELLOW
CREAT SERPL-MCNC: 1.1 MG/DL
CREAT SPEC-SCNC: 92 MG/DL
CREAT/PROT UR: 0.2 RATIO
EGFR: 96 ML/MIN/1.73M2
EPITHELIAL CELLS: 0 /HPF
GLUCOSE QUALITATIVE U: NEGATIVE MG/DL
GLUCOSE SERPL-MCNC: 104 MG/DL
KETONES URINE: NEGATIVE MG/DL
LEUKOCYTE ESTERASE URINE: NEGATIVE
MICROSCOPIC-UA: NORMAL
NITRITE URINE: NEGATIVE
PARATHYROID HORMONE INTACT: 35 PG/ML
PH URINE: 5.5
POTASSIUM SERPL-SCNC: 4.5 MMOL/L
PROT SERPL-MCNC: 7.9 G/DL
PROT UR-MCNC: 22 MG/DL
PROTEIN URINE: 30 MG/DL
RED BLOOD CELLS URINE: 3 /HPF
SODIUM SERPL-SCNC: 140 MMOL/L
SPECIFIC GRAVITY URINE: 1.02
TACROLIMUS SERPL-MCNC: 4.3 NG/ML
UROBILINOGEN URINE: 0.2 MG/DL
WHITE BLOOD CELLS URINE: 1 /HPF

## 2023-07-18 PROCEDURE — 99214 OFFICE O/P EST MOD 30 MIN: CPT

## 2023-07-18 RX ORDER — ACETAMINOPHEN 325 MG/1
325 TABLET, FILM COATED ORAL
Qty: 10 | Refills: 3 | Status: DISCONTINUED | OUTPATIENT
Start: 2021-02-17 | End: 2023-07-18

## 2023-07-18 RX ORDER — CAMPHOR 0.45 %
25 GEL (GRAM) TOPICAL
Qty: 10 | Refills: 3 | Status: DISCONTINUED | OUTPATIENT
Start: 2021-02-17 | End: 2023-07-18

## 2023-07-18 NOTE — HISTORY OF PRESENT ILLNESS
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  First kidney failed in .  Pt went on dialysis for about 1 year then received a DDRT 2019 at [facility].  Early biopsies revealed some PTC and inflammation, he was given thymoglobulin and plasma exchange.  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  Pt follows with Dr. Archer.  Has been doing well. 2019 pt has labs, tacro was 4.8 and creatinine was 0.74mg/dl.  Pt may have had Covid when pandemic started.  Pt denies diabetes, has HTN.  Pt is currently on prograf and prednisone.  MMF was held due to BK virus.  BK peaked at almost 50,000 but trended down below 5000 recently.  He is receiving IVIg monthly at [place].  Lives in [place].  \par \par For his first transplant he received thymoglobulin induction and had been maintained on prograf, cellcept and prednisone. He underwent a kidney biopsy in  and another one in  which revealed moderate IFTA/vascular changes consistent with chronic rejection versus hypertensive changes.  \par \par Lives with parents, single.  No smoking or alcohol abuse.  Currently working in a school as a .   [de-identified] : Last BK pcr very low level - <33.  Works at [employer].  Has lost some weight.  Went to [facility] with "mono" and pneumonia.  Treated with antibiotics at home.

## 2023-07-18 NOTE — ASSESSMENT
[FreeTextEntry1] : 1.  Renal transplant - pt s/p second DDRT both at [facility].  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   IVIg given for DSA chronic AMR risk but stopped.  Increased MMF and stopped IVIg. Renal function has been stable. \par 2.  Immunosuppression - Pt currently on tacrolimus, MMF 500mgs BID and prednisone 5mgs daily.  Pt has history of high DSA.  Has decreasing amounts of proteinuria.  \par 3.  HTN - well controlled.  \par 4.  URI - has resolved. \par \par Will bring pt back for f/u in 3 months. \par

## 2023-07-20 LAB
BKV DNA SPEC QL NAA+PROBE: ABNORMAL IU/ML
CMV DNA SPEC QL NAA+PROBE: NOT DETECTED IU/ML
CMVPCR LOG: NOT DETECTED LOG10IU/ML

## 2023-10-23 ENCOUNTER — APPOINTMENT (OUTPATIENT)
Dept: NEPHROLOGY | Facility: CLINIC | Age: 23
End: 2023-10-23
Payer: MEDICAID

## 2023-10-23 ENCOUNTER — NON-APPOINTMENT (OUTPATIENT)
Age: 23
End: 2023-10-23

## 2023-10-23 ENCOUNTER — LABORATORY RESULT (OUTPATIENT)
Age: 23
End: 2023-10-23

## 2023-10-23 VITALS
SYSTOLIC BLOOD PRESSURE: 138 MMHG | HEIGHT: 66 IN | TEMPERATURE: 97.6 F | OXYGEN SATURATION: 97 % | WEIGHT: 193 LBS | HEART RATE: 84 BPM | BODY MASS INDEX: 31.02 KG/M2 | DIASTOLIC BLOOD PRESSURE: 55 MMHG

## 2023-10-23 LAB
ALBUMIN SERPL ELPH-MCNC: 4.5 G/DL
ALP BLD-CCNC: 100 U/L
ALT SERPL-CCNC: 23 U/L
ANION GAP SERPL CALC-SCNC: 8 MMOL/L
APPEARANCE: CLEAR
AST SERPL-CCNC: 24 U/L
BILIRUB SERPL-MCNC: 0.8 MG/DL
BILIRUBIN URINE: NEGATIVE
BLOOD URINE: ABNORMAL
BUN SERPL-MCNC: 25 MG/DL
CALCIUM SERPL-MCNC: 10.5 MG/DL
CHLORIDE SERPL-SCNC: 104 MMOL/L
CO2 SERPL-SCNC: 28 MMOL/L
COLOR: YELLOW
CREAT SERPL-MCNC: 1.11 MG/DL
CREAT SPEC-SCNC: 130 MG/DL
EGFR: 95 ML/MIN/1.73M2
GLUCOSE QUALITATIVE U: NEGATIVE MG/DL
GLUCOSE SERPL-MCNC: 100 MG/DL
HCT VFR BLD CALC: 45.3 %
HGB BLD-MCNC: 14.7 G/DL
KETONES URINE: NEGATIVE MG/DL
LEUKOCYTE ESTERASE URINE: NEGATIVE
MAGNESIUM SERPL-MCNC: 1.7 MG/DL
MCHC RBC-ENTMCNC: 28.9 PG
MCHC RBC-ENTMCNC: 32.5 GM/DL
MCV RBC AUTO: 89 FL
MICROALBUMIN 24H UR DL<=1MG/L-MCNC: 15 MG/DL
MICROALBUMIN/CREAT 24H UR-RTO: 116 MG/G
NITRITE URINE: NEGATIVE
PH URINE: 6
PHOSPHATE SERPL-MCNC: 2.5 MG/DL
PLATELET # BLD AUTO: 259 K/UL
POTASSIUM SERPL-SCNC: 4.2 MMOL/L
PROT SERPL-MCNC: 7.6 G/DL
PROTEIN URINE: 30 MG/DL
RBC # BLD: 5.09 M/UL
RBC # FLD: 12.7 %
SODIUM SERPL-SCNC: 140 MMOL/L
SPECIFIC GRAVITY URINE: 1.02
TACROLIMUS SERPL-MCNC: 6.3 NG/ML
UROBILINOGEN URINE: 0.2 MG/DL
WBC # FLD AUTO: 5.83 K/UL

## 2023-10-23 PROCEDURE — 99214 OFFICE O/P EST MOD 30 MIN: CPT

## 2023-10-24 ENCOUNTER — NON-APPOINTMENT (OUTPATIENT)
Age: 23
End: 2023-10-24

## 2023-10-24 LAB
BKV DNA SPEC QL NAA+PROBE: 78 IU/ML
CMV DNA SPEC QL NAA+PROBE: NOT DETECTED IU/ML
CMVPCR LOG: NOT DETECTED LOG10IU/ML

## 2023-12-05 RX ORDER — MYCOPHENOLATE MOFETIL 500 MG/1
500 TABLET ORAL
Qty: 180 | Refills: 3 | Status: ACTIVE | COMMUNITY
Start: 2022-03-15 | End: 1900-01-01

## 2023-12-05 RX ORDER — TACROLIMUS 0.5 MG/1
0.5 CAPSULE ORAL
Qty: 180 | Refills: 3 | Status: ACTIVE | COMMUNITY
Start: 2021-01-11 | End: 1900-01-01

## 2023-12-05 RX ORDER — PREDNISONE 5 MG/1
5 TABLET ORAL
Qty: 90 | Refills: 3 | Status: ACTIVE | COMMUNITY
Start: 2021-01-11 | End: 1900-01-01

## 2024-01-08 ENCOUNTER — APPOINTMENT (OUTPATIENT)
Dept: NEPHROLOGY | Facility: CLINIC | Age: 24
End: 2024-01-08

## 2024-04-19 ENCOUNTER — NON-APPOINTMENT (OUTPATIENT)
Age: 24
End: 2024-04-19

## 2024-04-19 ENCOUNTER — APPOINTMENT (OUTPATIENT)
Dept: NEPHROLOGY | Facility: CLINIC | Age: 24
End: 2024-04-19
Payer: MEDICAID

## 2024-04-19 VITALS
OXYGEN SATURATION: 99 % | HEART RATE: 77 BPM | BODY MASS INDEX: 35.33 KG/M2 | WEIGHT: 192 LBS | RESPIRATION RATE: 16 BRPM | HEIGHT: 62 IN | TEMPERATURE: 98 F | SYSTOLIC BLOOD PRESSURE: 115 MMHG | DIASTOLIC BLOOD PRESSURE: 81 MMHG

## 2024-04-19 DIAGNOSIS — I10 ESSENTIAL (PRIMARY) HYPERTENSION: ICD-10-CM

## 2024-04-19 DIAGNOSIS — Z94.0 KIDNEY TRANSPLANT STATUS: ICD-10-CM

## 2024-04-19 DIAGNOSIS — Z79.60 LONG TERM (CURRENT) USE OF UNSPECIFIED IMMUNOMODULATORS AND IMMUNOSUPPRESSANTS: ICD-10-CM

## 2024-04-19 PROCEDURE — 99214 OFFICE O/P EST MOD 30 MIN: CPT

## 2024-04-23 ENCOUNTER — EMERGENCY (EMERGENCY)
Facility: HOSPITAL | Age: 24
LOS: 0 days | Discharge: ANOTHER HOSPITAL | End: 2024-04-24
Attending: STUDENT IN AN ORGANIZED HEALTH CARE EDUCATION/TRAINING PROGRAM
Payer: OTHER MISCELLANEOUS

## 2024-04-23 VITALS
DIASTOLIC BLOOD PRESSURE: 88 MMHG | TEMPERATURE: 98 F | HEIGHT: 65 IN | WEIGHT: 190.04 LBS | SYSTOLIC BLOOD PRESSURE: 145 MMHG | RESPIRATION RATE: 18 BRPM | OXYGEN SATURATION: 100 % | HEART RATE: 83 BPM

## 2024-04-23 DIAGNOSIS — Z94.0 KIDNEY TRANSPLANT STATUS: Chronic | ICD-10-CM

## 2024-04-23 DIAGNOSIS — T86.12 KIDNEY TRANSPLANT FAILURE: Chronic | ICD-10-CM

## 2024-04-23 DIAGNOSIS — S01.112A LACERATION WITHOUT FOREIGN BODY OF LEFT EYELID AND PERIOCULAR AREA, INITIAL ENCOUNTER: ICD-10-CM
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
## 2024-04-23 DIAGNOSIS — Z23 ENCOUNTER FOR IMMUNIZATION: ICD-10-CM
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
## 2024-04-23 DIAGNOSIS — I10 ESSENTIAL (PRIMARY) HYPERTENSION: ICD-10-CM

## 2024-04-23 LAB
ALBUMIN SERPL ELPH-MCNC: 4.2 G/DL — SIGNIFICANT CHANGE UP (ref 3.3–5)
ALBUMIN SERPL ELPH-MCNC: 4.4 G/DL
ALP BLD-CCNC: 105 U/L
ALP SERPL-CCNC: 106 U/L — SIGNIFICANT CHANGE UP (ref 40–120)
ALT FLD-CCNC: 22 U/L — SIGNIFICANT CHANGE UP (ref 12–78)
ALT SERPL-CCNC: 17 U/L
ANION GAP SERPL CALC-SCNC: 12 MMOL/L
ANION GAP SERPL CALC-SCNC: 4 MMOL/L — LOW (ref 5–17)
APPEARANCE: CLEAR
APTT BLD: 32.2 SEC — SIGNIFICANT CHANGE UP (ref 24.5–35.6)
AST SERPL-CCNC: 18 U/L
AST SERPL-CCNC: 24 U/L — SIGNIFICANT CHANGE UP (ref 15–37)
BACTERIA: NEGATIVE /HPF
BASOPHILS # BLD AUTO: 0.04 K/UL — SIGNIFICANT CHANGE UP (ref 0–0.2)
BASOPHILS # BLD AUTO: 0.05 K/UL
BASOPHILS NFR BLD AUTO: 0.4 % — SIGNIFICANT CHANGE UP (ref 0–2)
BASOPHILS NFR BLD AUTO: 0.6 %
BILIRUB SERPL-MCNC: 0.8 MG/DL
BILIRUB SERPL-MCNC: 0.8 MG/DL — SIGNIFICANT CHANGE UP (ref 0.2–1.2)
BILIRUBIN URINE: NEGATIVE
BKV DNA SPEC QL NAA+PROBE: NOT DETECTED IU/ML
BLD GP AB SCN SERPL QL: SIGNIFICANT CHANGE UP
BLOOD URINE: ABNORMAL
BUN SERPL-MCNC: 23 MG/DL — SIGNIFICANT CHANGE UP (ref 7–23)
BUN SERPL-MCNC: 24 MG/DL
CALCIUM SERPL-MCNC: 10 MG/DL — SIGNIFICANT CHANGE UP (ref 8.5–10.1)
CALCIUM SERPL-MCNC: 9.9 MG/DL
CALCIUM SERPL-MCNC: 9.9 MG/DL
CAST: 0 /LPF
CHLORIDE SERPL-SCNC: 104 MMOL/L
CHLORIDE SERPL-SCNC: 108 MMOL/L — SIGNIFICANT CHANGE UP (ref 96–108)
CMV DNA SPEC QL NAA+PROBE: NOT DETECTED IU/ML
CMVPCR LOG: NOT DETECTED LOG10IU/ML
CO2 SERPL-SCNC: 23 MMOL/L
CO2 SERPL-SCNC: 27 MMOL/L — SIGNIFICANT CHANGE UP (ref 22–31)
COLOR: YELLOW
CREAT SERPL-MCNC: 1.03 MG/DL
CREAT SERPL-MCNC: 1.13 MG/DL — SIGNIFICANT CHANGE UP (ref 0.5–1.3)
CREAT SPEC-SCNC: 85 MG/DL
CREAT/PROT UR: 0.3 RATIO
EGFR: 103 ML/MIN/1.73M2
EGFR: 92 ML/MIN/1.73M2 — SIGNIFICANT CHANGE UP
EOSINOPHIL # BLD AUTO: 0.11 K/UL — SIGNIFICANT CHANGE UP (ref 0–0.5)
EOSINOPHIL # BLD AUTO: 0.25 K/UL
EOSINOPHIL NFR BLD AUTO: 1 % — SIGNIFICANT CHANGE UP (ref 0–6)
EOSINOPHIL NFR BLD AUTO: 3.2 %
EPITHELIAL CELLS: 0 /HPF
GLUCOSE QUALITATIVE U: NEGATIVE MG/DL
GLUCOSE SERPL-MCNC: 101 MG/DL — HIGH (ref 70–99)
GLUCOSE SERPL-MCNC: 92 MG/DL
HCT VFR BLD CALC: 41 %
HCT VFR BLD CALC: 43.5 % — SIGNIFICANT CHANGE UP (ref 39–50)
HGB BLD-MCNC: 13.7 G/DL
HGB BLD-MCNC: 14.4 G/DL — SIGNIFICANT CHANGE UP (ref 13–17)
IMM GRANULOCYTES NFR BLD AUTO: 0.1 %
IMM GRANULOCYTES NFR BLD AUTO: 0.2 % — SIGNIFICANT CHANGE UP (ref 0–0.9)
INR BLD: 1.05 RATIO — SIGNIFICANT CHANGE UP (ref 0.85–1.18)
KETONES URINE: NEGATIVE MG/DL
LEUKOCYTE ESTERASE URINE: NEGATIVE
LYMPHOCYTES # BLD AUTO: 2.7 K/UL — SIGNIFICANT CHANGE UP (ref 1–3.3)
LYMPHOCYTES # BLD AUTO: 2.83 K/UL
LYMPHOCYTES # BLD AUTO: 25.1 % — SIGNIFICANT CHANGE UP (ref 13–44)
LYMPHOCYTES NFR BLD AUTO: 35.7 %
MAN DIFF?: NORMAL
MCHC RBC-ENTMCNC: 29.4 PG
MCHC RBC-ENTMCNC: 29.6 PG — SIGNIFICANT CHANGE UP (ref 27–34)
MCHC RBC-ENTMCNC: 33.1 GM/DL — SIGNIFICANT CHANGE UP (ref 32–36)
MCHC RBC-ENTMCNC: 33.4 GM/DL
MCV RBC AUTO: 88 FL
MCV RBC AUTO: 89.5 FL — SIGNIFICANT CHANGE UP (ref 80–100)
MICROSCOPIC-UA: NORMAL
MONOCYTES # BLD AUTO: 0.72 K/UL — SIGNIFICANT CHANGE UP (ref 0–0.9)
MONOCYTES # BLD AUTO: 0.76 K/UL
MONOCYTES NFR BLD AUTO: 6.7 % — SIGNIFICANT CHANGE UP (ref 2–14)
MONOCYTES NFR BLD AUTO: 9.6 %
NEUTROPHILS # BLD AUTO: 4.03 K/UL
NEUTROPHILS # BLD AUTO: 7.15 K/UL — SIGNIFICANT CHANGE UP (ref 1.8–7.4)
NEUTROPHILS NFR BLD AUTO: 50.8 %
NEUTROPHILS NFR BLD AUTO: 66.6 % — SIGNIFICANT CHANGE UP (ref 43–77)
NITRITE URINE: NEGATIVE
PARATHYROID HORMONE INTACT: 34 PG/ML
PH URINE: 6
PLATELET # BLD AUTO: 254 K/UL
PLATELET # BLD AUTO: 276 K/UL — SIGNIFICANT CHANGE UP (ref 150–400)
POTASSIUM SERPL-MCNC: 4 MMOL/L — SIGNIFICANT CHANGE UP (ref 3.5–5.3)
POTASSIUM SERPL-SCNC: 4 MMOL/L
POTASSIUM SERPL-SCNC: 4 MMOL/L — SIGNIFICANT CHANGE UP (ref 3.5–5.3)
PROT SERPL-MCNC: 7.6 G/DL
PROT SERPL-MCNC: 8.4 GM/DL — HIGH (ref 6–8.3)
PROT UR-MCNC: 21 MG/DL
PROTEIN URINE: NORMAL MG/DL
PROTHROM AB SERPL-ACNC: 11.8 SEC — SIGNIFICANT CHANGE UP (ref 9.5–13)
RBC # BLD: 4.66 M/UL
RBC # BLD: 4.86 M/UL — SIGNIFICANT CHANGE UP (ref 4.2–5.8)
RBC # FLD: 12.8 % — SIGNIFICANT CHANGE UP (ref 10.3–14.5)
RBC # FLD: 13.1 %
RED BLOOD CELLS URINE: 3 /HPF
REVIEW: NORMAL
SODIUM SERPL-SCNC: 138 MMOL/L
SODIUM SERPL-SCNC: 139 MMOL/L — SIGNIFICANT CHANGE UP (ref 135–145)
SPECIFIC GRAVITY URINE: 1.02
TACROLIMUS SERPL-MCNC: 5.1 NG/ML
UROBILINOGEN URINE: 0.2 MG/DL
WBC # BLD: 10.74 K/UL — HIGH (ref 3.8–10.5)
WBC # FLD AUTO: 10.74 K/UL — HIGH (ref 3.8–10.5)
WBC # FLD AUTO: 7.93 K/UL
WHITE BLOOD CELLS URINE: 1 /HPF

## 2024-04-23 PROCEDURE — 90715 TDAP VACCINE 7 YRS/> IM: CPT

## 2024-04-23 PROCEDURE — 86900 BLOOD TYPING SEROLOGIC ABO: CPT

## 2024-04-23 PROCEDURE — 85730 THROMBOPLASTIN TIME PARTIAL: CPT

## 2024-04-23 PROCEDURE — 86850 RBC ANTIBODY SCREEN: CPT

## 2024-04-23 PROCEDURE — 85610 PROTHROMBIN TIME: CPT

## 2024-04-23 PROCEDURE — 96374 THER/PROPH/DIAG INJ IV PUSH: CPT

## 2024-04-23 PROCEDURE — 99285 EMERGENCY DEPT VISIT HI MDM: CPT

## 2024-04-23 PROCEDURE — 86901 BLOOD TYPING SEROLOGIC RH(D): CPT

## 2024-04-23 PROCEDURE — 99285 EMERGENCY DEPT VISIT HI MDM: CPT | Mod: 25

## 2024-04-23 PROCEDURE — 85025 COMPLETE CBC W/AUTO DIFF WBC: CPT

## 2024-04-23 PROCEDURE — 36415 COLL VENOUS BLD VENIPUNCTURE: CPT

## 2024-04-23 PROCEDURE — 90471 IMMUNIZATION ADMIN: CPT

## 2024-04-23 PROCEDURE — 80053 COMPREHEN METABOLIC PANEL: CPT

## 2024-04-23 RX ORDER — CEFAZOLIN SODIUM 1 G
2000 VIAL (EA) INJECTION ONCE
Refills: 0 | Status: DISCONTINUED | OUTPATIENT
Start: 2024-04-23 | End: 2024-04-23

## 2024-04-23 RX ORDER — CEFAZOLIN SODIUM 1 G
2000 VIAL (EA) INJECTION ONCE
Refills: 0 | Status: COMPLETED | OUTPATIENT
Start: 2024-04-23 | End: 2024-04-23

## 2024-04-23 RX ORDER — TETANUS TOXOID, REDUCED DIPHTHERIA TOXOID AND ACELLULAR PERTUSSIS VACCINE, ADSORBED 5; 2.5; 8; 8; 2.5 [IU]/.5ML; [IU]/.5ML; UG/.5ML; UG/.5ML; UG/.5ML
0.5 SUSPENSION INTRAMUSCULAR ONCE
Refills: 0 | Status: COMPLETED | OUTPATIENT
Start: 2024-04-23 | End: 2024-04-23

## 2024-04-23 RX ADMIN — Medication 2000 MILLIGRAM(S): at 21:13

## 2024-04-23 RX ADMIN — TETANUS TOXOID, REDUCED DIPHTHERIA TOXOID AND ACELLULAR PERTUSSIS VACCINE, ADSORBED 0.5 MILLILITER(S): 5; 2.5; 8; 8; 2.5 SUSPENSION INTRAMUSCULAR at 21:13

## 2024-04-23 NOTE — ED STATDOCS - OBJECTIVE STATEMENT
23 year old male with PMHx of atrophic kidney, anemia, kidney transplant, HTN; presents to ED c/o laceration to inner corner of right eye sustained when a piece of wood hit his eye while cutting wood at work. Unknown last tdap.

## 2024-04-23 NOTE — ED STATDOCS - CLINICAL SUMMARY MEDICAL DECISION MAKING FREE TEXT BOX
Plan labs, antibiotics, update tdap, opthalmology and reassess. Plan labs, antibiotics, update tdap, opthalmology and reassess.    Patient with 2 small lacerations through and through involving medical canthus and upper eyelid. ophthalmology consulted and will arrange transfer to [facility] for repair. Tetanus updated. Given ancef, NPO as per opthalmology. Transferred in stable condition. Consent to transfer form completed and signed by patient.

## 2024-04-23 NOTE — ED ADULT NURSE NOTE - CHIEF COMPLAINT QUOTE
pt BIBEMS c/o laceration to eye. pt reports working at [employer] when a piece of wood cut his R. eye. pt noted to have laceration to inner corner of R. eye, minimal bleeding noted at this time. pt reports having minor pain. NKDA. [facility] kidney transplant.

## 2024-04-23 NOTE — ED ADULT NURSE NOTE - NSFALLUNIVINTERV_ED_ALL_ED
Bed/Stretcher in lowest position, wheels locked, appropriate side rails in place/Call bell, personal items and telephone in reach/Instruct patient to call for assistance before getting out of bed/chair/stretcher/Non-slip footwear applied when patient is off stretcher/[place] to call system/Physically safe environment - no spills, clutter or unnecessary equipment/Purposeful proactive rounding/Room/bathroom lighting operational, light cord in reach

## 2024-04-23 NOTE — ED STATDOCS - PHYSICAL EXAMINATION
Const: Well appearing, NAD  Eyes: PERRL, EOM intact, erythematous to medial side of globe, intact, no obvious laceration to globe. laceration to medial cantus upper eye  CV: RRR, no murmurs, no chest wall tenderness, distal pulses intact  Resp: CTAB, normal resp effort  GI: soft, nondistended, nontender  MSK: Full ROM, no muscle or bony deformity or tenderness  Neuro: AOx3, GCS 15, No focal deficits  Skin: No rash, abrasion, laceration to medial cantus upper eye  Psych: calm, cooperative.

## 2024-04-23 NOTE — ED ADULT TRIAGE NOTE - CHIEF COMPLAINT QUOTE
pt BIBEMS c/o laceration to eye. pt reports working at [employer] when a piece of wood cut his R. eye. pt noted to have laceration to inner corner of R. eye, minimal bleeding noted at this time. pt reports having minor pain. NKDA. -PMH. pt BIBEMS c/o laceration to eye. pt reports working at [employer] when a piece of wood cut his R. eye. pt noted to have laceration to inner corner of R. eye, minimal bleeding noted at this time. pt reports having minor pain. NKDA. [facility] kidney transplant.

## 2024-04-23 NOTE — ED ADULT NURSE NOTE - OBJECTIVE STATEMENT
pt presents to the ED c/o laceration to eye. pt reports working at [employer] when a piece of wood cut his R. eye. pt noted to have laceration to inner corner of R. eye, minimal bleeding noted at this time. pt reports having minor pain. NKDA. pt A&Ox4, well appearing, and ambulatory at baseline with no further complaints or discomforts reported at this time.

## 2024-04-24 ENCOUNTER — EMERGENCY (EMERGENCY)
Facility: HOSPITAL | Age: 24
LOS: 1 days | Discharge: HOME | End: 2024-04-24
Attending: STUDENT IN AN ORGANIZED HEALTH CARE EDUCATION/TRAINING PROGRAM | Admitting: STUDENT IN AN ORGANIZED HEALTH CARE EDUCATION/TRAINING PROGRAM
Payer: OTHER MISCELLANEOUS

## 2024-04-24 VITALS
TEMPERATURE: 98 F | OXYGEN SATURATION: 99 % | RESPIRATION RATE: 18 BRPM | DIASTOLIC BLOOD PRESSURE: 80 MMHG | HEART RATE: 80 BPM | SYSTOLIC BLOOD PRESSURE: 148 MMHG | HEIGHT: 65 IN

## 2024-04-24 VITALS
DIASTOLIC BLOOD PRESSURE: 76 MMHG | HEART RATE: 76 BPM | SYSTOLIC BLOOD PRESSURE: 128 MMHG | RESPIRATION RATE: 18 BRPM | TEMPERATURE: 98 F | OXYGEN SATURATION: 98 %

## 2024-04-24 VITALS
HEART RATE: 74 BPM | DIASTOLIC BLOOD PRESSURE: 92 MMHG | RESPIRATION RATE: 17 BRPM | OXYGEN SATURATION: 98 % | TEMPERATURE: 98 F | SYSTOLIC BLOOD PRESSURE: 158 MMHG

## 2024-04-24 DIAGNOSIS — Z94.0 KIDNEY TRANSPLANT STATUS: Chronic | ICD-10-CM

## 2024-04-24 DIAGNOSIS — T86.12 KIDNEY TRANSPLANT FAILURE: Chronic | ICD-10-CM

## 2024-04-24 LAB — ABO RH CONFIRMATION: SIGNIFICANT CHANGE UP

## 2024-04-24 PROCEDURE — 70480 CT ORBIT/EAR/FOSSA W/O DYE: CPT | Mod: 26,MC,59

## 2024-04-24 PROCEDURE — 99053 MED SERV 10PM-8AM 24 HR FAC: CPT

## 2024-04-24 PROCEDURE — 70450 CT HEAD/BRAIN W/O DYE: CPT | Mod: 26,MC

## 2024-04-24 PROCEDURE — 99284 EMERGENCY DEPT VISIT MOD MDM: CPT

## 2024-04-24 RX ORDER — OFLOXACIN 0.3 %
1 DROPS OPHTHALMIC (EYE)
Qty: 1 | Refills: 0
Start: 2024-04-24 | End: 2024-05-03

## 2024-04-24 RX ORDER — OFLOXACIN 0.3 %
1 DROPS OPHTHALMIC (EYE) ONCE
Refills: 0 | Status: COMPLETED | OUTPATIENT
Start: 2024-04-24 | End: 2024-04-24

## 2024-04-24 RX ORDER — ERYTHROMYCIN BASE 5 MG/GRAM
1 OINTMENT (GRAM) OPHTHALMIC (EYE)
Qty: 1 | Refills: 0
Start: 2024-04-24 | End: 2024-04-30

## 2024-04-24 RX ADMIN — Medication 1 DROP(S): at 06:51

## 2024-04-24 NOTE — ED PROVIDER NOTE - PROGRESS NOTE DETAILS
Luis PGY3: Optho resident Dr Luis Newman consulted, to see pt Luis PGY3: s/p repair, to dc with ophtho f/u and eye drops/ointment.

## 2024-04-24 NOTE — ED PROVIDER NOTE - ADDITIONAL NOTES AND INSTRUCTIONS:
To Whom it May Concern - Patient seen and evaluated in Emergency Room. Please excuse the above individual for medical care and recovery until date above. Recommend eye protection while working for next 2 weeks or when cleared by medical providers. Thank you for you care. - Danita Smith MD.

## 2024-04-24 NOTE — ED PROVIDER NOTE - PHYSICAL EXAMINATION
CONSTITUTIONAL: NAD  SKIN: Warm dry  HEAD: NCAT  EYES: b/l pupils ~3mm with poor constriction on R side; +medial injury with small lac thru medial lid, +tearing, +mucus ; eyes move smoothly and conjugate, no proptosis   NECK: Supple  CARD: RRR  RESP: CTAB  ABD: S/NT no R/G  NEURO: Grossly unremarkable  PSYCH: Cooperative, appropriate.

## 2024-04-24 NOTE — ED PROVIDER NOTE - OBJECTIVE STATEMENT
24yo M with PMH of ESRD s/p transplant transferred from  for optho after hit in R eye by wood piece at work. Initially blurry, still doesn't appear clear but can see now better. Eye moves smoothly. Some pain irene when squeezing eye shut. +some blood and tearing. Tdap and Ancef given at  before transfer. No other injury. No HA, CP, SOB, abd pain, NVDC.

## 2024-04-24 NOTE — ED PROVIDER NOTE - NSFOLLOWUPCLINICS_GEN_ALL_ED_FT
[facility] - Ophthalmology  Ophthalmology  [address]  [place] [address]  Phone: [phone]  Fax:

## 2024-04-24 NOTE — ED ADULT NURSE NOTE - NS ED NOTE  TALK SOMEONE YN
No abnormality seen on the chest x-ray. The cause of the cough is unclear. Continue to use your inhaler as needed for wheezing. Take the steroid daily for the next few days. Take Tessalon as needed for cough. Your blood pressure is elevated and needs further evaluation by your primary doctor. Call to make a follow-up appointment. No

## 2024-04-24 NOTE — ED PROVIDER NOTE - CLINICAL SUMMARY MEDICAL DECISION MAKING FREE TEXT BOX
Luis PGY3: 24yo M with PMH of ESRD s/p transplant transferred from  for optho after hit in R eye by wood, s/p tdap/ancef, optho 2c. +R upper lid injury with possible eye injury. Eye moves smoothly on gross exam but +mucus/blood. Dispo based on optho eval.

## 2024-04-24 NOTE — ED PROVIDER NOTE - ATTENDING CONTRIBUTION TO CARE
I have personally performed a face to face medical and diagnostic evaluation of the patient. I have discussed with and reviewed the Resident's note and agree with the History, ROS, Physical Exam and MDM unless otherwise indicated. A brief summary of my personal evaluation and impression can be found below. I have personally performed a face to face medical and diagnostic evaluation of the patient. I have discussed with and reviewed the Resident's note and agree with the History, ROS, Physical Exam and MDM unless otherwise indicated. A brief summary of my personal evaluation and impression can be found below.     23-year-old male with past medical history of renal transplant presenting with chief complaint of injury to the right eye.  Patient works at [employer], was moving wooden planks when something fell on him and injured the right eye.  Patient now presenting with lacerations and concern for corneal abrasion, eval for oculoplastics intervention.  On exam, some pain in extraocular movement to the left.  Lacerations as noted above.  Will call Optho, and reassess to dispo. Josefina Walter, ED Attending

## 2024-04-24 NOTE — ED ADULT NURSE NOTE - OBJECTIVE STATEMENT
Break RN note- Patient arrives to the ED from [place] for an optho consult. Patient reports he was at work today and while moving around wood [employer] at work he slipped and a piece of wound ended up in his right eye. Patient reports some pain but denies any vision changes, head injury, LOC, dizziness. Patient reports he was dizzy in the moment of injury that has since resolved. Patient arrives with a 20g IV to the right arm. Left arm precautions for a left AV fistula. Patient breathing even and nonlabored. No acute distress. Safety maintained. Patient stable upon exiting the room.

## 2024-04-24 NOTE — ED ADULT NURSE NOTE - CHIEF COMPLAINT QUOTE
Pt transfer for [facility] for optho. R eye injury from wood particle while at work . Denies vision changes, endorsing pain. 20G Rt AC received ancef and tetanus. Hx Old Left AV fistula, Kidney transplant 2007.

## 2024-04-24 NOTE — CONSULT NOTE ADULT - SUBJECTIVE AND OBJECTIVE BOX
[facility] DEPARTMENT OF OPHTHALMOLOGY - INITIAL ADULT CONSULT  ----------------------------------------------------------------------------------------------------  Luis Newman PGY-2  Available on teams  ----------------------------------------------------------------------------------------------------    HPI: 24yo M with PMH of ESRD s/p transplant transferred from  for optho after hit in R eye by wood piece at work. Initially blurry, still doesn't appear clear but can see now better. Eye moves smoothly. Some pain irene when squeezing eye shut. +some blood and tearing. Tdap and Ancef given at  before transfer. No other injury. No HA, CP, SOB, abd pain, NVDC.        PAST MEDICAL & SURGICAL HISTORY:  Kidney Transplant  nov 2007 (due to renal dysplasia)      Atrophic kidney      Hypertension      Anemia      H/O kidney transplant      Kidney transplant failure and rejection        Past Ocular History: None  Ophthalmic Medications: None  FAMILY HISTORY:      MEDICATIONS  (STANDING):    MEDICATIONS  (PRN):    Allergies & Intolerances:   No Known Allergies    Review of Systems:  Constitutional: No fever, chills  Eyes: See HPI   Neuro: No tremors  Cardiovascular: No chest pain, palpitations  Respiratory: No SOB, no cough  GI: No nausea, vomiting, abdominal pain  : No dysuria  Skin: no rash  Psych: no depression  Endocrine: no polyuria, polydipsia  Heme/lymph: no swelling    VITALS: T(C): 36.9 (04-24-24 @ 02:14)  T(F): 98.5 (04-24-24 @ 02:14), Max: 98.5 (04-24-24 @ 02:14)  HR: 80 (04-24-24 @ 02:14) (74 - 83)  BP: 148/80 (04-24-24 @ 02:14) (138/77 - 158/92)  RR:  (17 - 18)  SpO2:  (98% - 100%)  Wt(kg): --  General: AAO x 3, appropriate mood and affect    Ophthalmology Exam:  Visual acuity (sc): 20/25 OD ; 20/20 OS  Pupils: OD: dark 4 mm, light 4 mm ; OS dark 3mm, light 2 mm ; no rAPD by reverse  Ttono: 19 OD 18 OS   Extraocular movements (EOMs): Full OU, no pain, no diplopia  Confrontational Visual Field (CVF): Full OU  Color Plates: 12/12 OU    Pen Light Exam (PLE)  External: Flat OU  Lids/Lashes/Lacrimal Ducts: OD: RUL vertical full thickness lid lac involving lid margin about midway point of upper lid, lower lid intact ;  Flat OS  Sclera/Conjunctiva: 1-2+ injection OD ; W+Q OU  Cornea: 1.5 mm x 1.5 mm epi defect peripherally at 5 oclock, 2+ SPK ; OS: 3+ SPK inferior  Anterior Chamber: D+F OU    Iris: Flat OU  Lens: Cl OU    Fundus Exam: dilated with 1% tropicamide and 2.5% phenylephrine  Approval obtained from primary team for dilation  Patient aware that pupils can remained dilated for at least 4-6 hours  Exam performed with 20D lens    Vitreous: wnl OU  Disc, cup/disc: sharp and pink, 0.4 OU ***   Macula: wnl OU  Vessels: wnl OU  Periphery: wnl OU    Labs/Imaging:  ***   [facility] DEPARTMENT OF OPHTHALMOLOGY - INITIAL ADULT CONSULT  ----------------------------------------------------------------------------------------------------  Luis Newman PGY-2  Available on teams  ----------------------------------------------------------------------------------------------------    HPI: 24yo M with PMH of ESRD s/p transplant transferred from  for optho after hit in R eye by wood piece at work. Initially blurry, still doesn't appear clear but can see now better. Eye moves smoothly. Some pain irene when squeezing eye shut. +some blood and tearing. Tdap and Ancef given at  before transfer. No other injury. No HA, CP, SOB, abd pain, NVDC.        PAST MEDICAL & SURGICAL HISTORY:  Kidney Transplant  nov 2007 (due to renal dysplasia)      Atrophic kidney      Hypertension      Anemia      H/O kidney transplant      Kidney transplant failure and rejection        Past Ocular History: None  Ophthalmic Medications: None  FAMILY HISTORY:      MEDICATIONS  (STANDING):    MEDICATIONS  (PRN):    Allergies & Intolerances:   No Known Allergies    Review of Systems:  Constitutional: No fever, chills  Eyes: See HPI   Neuro: No tremors  Cardiovascular: No chest pain, palpitations  Respiratory: No SOB, no cough  GI: No nausea, vomiting, abdominal pain  : No dysuria  Skin: no rash  Psych: no depression  Endocrine: no polyuria, polydipsia  Heme/lymph: no swelling    VITALS: T(C): 36.9 (04-24-24 @ 02:14)  T(F): 98.5 (04-24-24 @ 02:14), Max: 98.5 (04-24-24 @ 02:14)  HR: 80 (04-24-24 @ 02:14) (74 - 83)  BP: 148/80 (04-24-24 @ 02:14) (138/77 - 158/92)  RR:  (17 - 18)  SpO2:  (98% - 100%)  Wt(kg): --  General: AAO x 3, appropriate mood and affect    Ophthalmology Exam:  Visual acuity (sc): 20/25 OD ; 20/20 OS  Pupils: OD: dark 4 mm, light 4 mm ; OS dark 3mm, light 2 mm ; no rAPD by reverse  Ttono: 19 OD 18 OS   Extraocular movements (EOMs): Full OU, no pain, no diplopia  Confrontational Visual Field (CVF): Full OU  Color Plates: 12/12 OU    Pen Light Exam (PLE)  External: Flat OU  Lids/Lashes/Lacrimal Ducts: OD: RUL vertical full thickness lid lac involving lid margin about midway point of upper lid, lower lid intact ;  Flat OS  Sclera/Conjunctiva: 1-2+ injection OD ; W+Q OU  Cornea: 1.5 mm x 1.5 mm epi defect peripherally at 5 oclock, 2+ SPK ; OS: 3+ SPK inferior  Anterior Chamber: D+F OU    Iris: Flat OU  Lens: Cl OU    Fundus Exam: dilated with 1% tropicamide and 2.5% phenylephrine  Approval obtained from primary team for dilation  Patient aware that pupils can remained dilated for at least 4-6 hours  Exam performed with 20D lens    Vitreous: wnl OU  Disc, cup/disc: sharp and pink, 0.3 OU   Macula: wnl OU  Vessels: wnl OU  Periphery: wnl OU

## 2024-04-24 NOTE — ED PROVIDER NOTE - NSFOLLOWUPINSTRUCTIONS_ED_ALL_ED_FT
Eye Injury - Right eyelid laceration and Corneal Abrasion    - Continue ofloxacin 1 drop every 6 hours for next 7 days.  - Continue erythromycin 0.5% ointment three times a day a thin film on right laceration repair site on area.  - Continue small strip of erythromycin under right lower eye lid at bedtime for next 7 days.    - Can continue tylenol/motrin as needed for pain control.   - Recommend to avoid any further eye trauma given current injury - consider using work safe glasses.     Follow up in next week with OPHTHALMOLOGY on discharge.   [address]  [place] [address]  [phone]    For any changes, worsening vision, pus discharge or other concerns - return to medical evaluation.     Rest, drink plenty of fluids.  Advance activity as tolerated.  Follow up with your primary care physician in 48-72 hours- bring copies of your results.  Return to the ER for worsening or persistent symptoms, and/or ANY NEW OR CONCERNING SYMPTOMS.

## 2024-04-24 NOTE — ED ADULT TRIAGE NOTE - CHIEF COMPLAINT QUOTE
Pt transfer for [facility] for optho. R eye injury from wood piece. Denies vision changes, endorsing pain. 20G Rt AC received ancef and tetanus. Hx Old Left AV fistula, Kidney transplant 2007. Pt transfer for [facility] for optho. R eye injury from wood particle while at work . Denies vision changes, endorsing pain. 20G Rt AC received ancef and tetanus. Hx Old Left AV fistula, Kidney transplant 2007.

## 2024-04-24 NOTE — CONSULT NOTE ADULT - ASSESSMENT
24yo M with PMH of ESRD s/p transplant transferred from  for optho after hit in R eye by wood piece at work    # Right upper eyelid laceration  - Involving lid margin  - Lacrimal system not involved  - Consent obtained for RUL lac repair       # Corneal abrasion, right eye  - Small epi defect noted about 1.5 mm x 1.5 mm at 5oclock   - Recommend ofloxacin gtt to right eye QID   - Recommend erythromycin to right eye qHS ***   - Pt to follow up in clinic below, will arrange    Seen and discussed with Dr. Ortiz, chief resident.     Outpatient Follow-up: Patient should follow-up with his/her ophthalmologist or with [facility] Department of Ophthalmology within 1 week of after discharge at:    [address]  [place] [address]  [phone]   22yo M with PMH of ESRD s/p transplant transferred from  for optho after hit in R eye by wood piece at work    # Right upper eyelid laceration  - Involving lid margin  - Lacrimal system not involved  - Consent obtained for RUL lac repair - repaired in exam chair  - Recommend erythromycin ointment to wound TID   - Pt to follow up in oculoplastics clinic in the next 1-2 weeks, will arrange      # Corneal abrasion, right eye  - Small epi defect noted about 1.5 mm x 1.5 mm at 5oclock   - Recommend ofloxacin gtt to right eye QID   - Recommend erythromycin ointment to right eye qHS   - Pt to follow up in clinic below, will arrange    Seen and discussed with Dr. Ortiz, chief resident.     Outpatient Follow-up: Patient should follow-up with his/her ophthalmologist or with [facility] Department of Ophthalmology within 1 week of after discharge at:    [address]  [place], [place] [id number] [phone]

## 2024-04-24 NOTE — ED PROVIDER NOTE - PATIENT PORTAL LINK FT
You can access the FollowMyHealth Patient Portal offered by [facility] by registering at the following website: [email or web address]. By joining [employer]’s FollowMyHealth portal, you will also be able to view your health information using other applications (apps) compatible with our system.

## 2024-05-03 ENCOUNTER — APPOINTMENT (OUTPATIENT)
Dept: OPHTHALMOLOGY | Facility: CLINIC | Age: 24
End: 2024-05-03

## 2024-05-09 NOTE — CHART NOTE - NSCHARTNOTEFT_GEN_A_CORE
Approx 1cmm RUL margin-involving laceration repaired at slit lamp. Written consent obtained.    Povodine swab used to prep RUL. 1% lido with epi applied to local area. 7-0 vicryl sutures used to repair laceration. 0cc blood lost.    Patient tolerated well. Procedure: RUL laceration repair  Date: 4/24/24  Time: 03:30  Assistants : Aria Ortiz    Approx 1cmm RUL margin-involving laceration repaired at slit lamp. Written consent obtained.    Povodine swab used to prep RUL. 1% lido with epi applied to local area. 7-0 vicryl sutures used to repair laceration. 0cc blood lost.    Patient tolerated well.

## 2024-07-19 ENCOUNTER — APPOINTMENT (OUTPATIENT)
Dept: TRANSPLANT | Facility: CLINIC | Age: 24
End: 2024-07-19

## 2024-07-19 DIAGNOSIS — Z94.0 KIDNEY TRANSPLANT STATUS: ICD-10-CM

## 2024-07-19 LAB
ALBUMIN SERPL ELPH-MCNC: 4.5 G/DL
ALP BLD-CCNC: 98 U/L
ALT SERPL-CCNC: 21 U/L
ANION GAP SERPL CALC-SCNC: 12 MMOL/L
APPEARANCE: CLEAR
AST SERPL-CCNC: 19 U/L
BACTERIA: NEGATIVE /HPF
BILIRUB SERPL-MCNC: 0.7 MG/DL
BILIRUBIN URINE: NEGATIVE
BLOOD URINE: ABNORMAL
BUN SERPL-MCNC: 20 MG/DL
CALCIUM SERPL-MCNC: 9.8 MG/DL
CAST: 1 /LPF
CHLORIDE SERPL-SCNC: 105 MMOL/L
CO2 SERPL-SCNC: 23 MMOL/L
COLOR: YELLOW
CREAT SERPL-MCNC: 1.1 MG/DL
CREAT SPEC-SCNC: 96 MG/DL
CREAT/PROT UR: 0.3 RATIO
EGFR: 95 ML/MIN/1.73M2
EPITHELIAL CELLS: 0 /HPF
GLUCOSE QUALITATIVE U: NEGATIVE MG/DL
GLUCOSE SERPL-MCNC: 97 MG/DL
HCT VFR BLD CALC: 41.6 %
HGB BLD-MCNC: 13.5 G/DL
KETONES URINE: NEGATIVE MG/DL
LDH SERPL-CCNC: 186 U/L
LEUKOCYTE ESTERASE URINE: NEGATIVE
MAGNESIUM SERPL-MCNC: 1.8 MG/DL
MCHC RBC-ENTMCNC: 28.5 PG
MCHC RBC-ENTMCNC: 32.5 GM/DL
MCV RBC AUTO: 87.8 FL
MICROSCOPIC-UA: NORMAL
NITRITE URINE: NEGATIVE
PH URINE: 6
PHOSPHATE SERPL-MCNC: 2.9 MG/DL
PLATELET # BLD AUTO: 253 K/UL
POTASSIUM SERPL-SCNC: 4.2 MMOL/L
PROT SERPL-MCNC: 7.7 G/DL
PROT UR-MCNC: 26 MG/DL
PROTEIN URINE: 30 MG/DL
RBC # BLD: 4.74 M/UL
RBC # FLD: 12.7 %
RED BLOOD CELLS URINE: 2 /HPF
SODIUM SERPL-SCNC: 139 MMOL/L
SPECIFIC GRAVITY URINE: 1.02
TACROLIMUS SERPL-MCNC: 3.4 NG/ML
URATE SERPL-MCNC: 7.6 MG/DL
UROBILINOGEN URINE: 0.2 MG/DL
WBC # FLD AUTO: 7.4 K/UL
WHITE BLOOD CELLS URINE: 0 /HPF

## 2024-07-19 RX ORDER — TACROLIMUS 1 MG/1
1 CAPSULE ORAL
Refills: 0 | Status: ACTIVE | COMMUNITY
Start: 2024-07-19

## 2024-07-24 LAB
BKV DNA SPEC QL NAA+PROBE: NOT DETECTED IU/ML
CMV DNA SPEC QL NAA+PROBE: NOT DETECTED IU/ML
CMVPCR LOG: NOT DETECTED LOG10IU/ML

## 2024-07-29 ENCOUNTER — APPOINTMENT (OUTPATIENT)
Dept: OPHTHALMOLOGY | Facility: CLINIC | Age: 24
End: 2024-07-29

## 2024-08-01 ENCOUNTER — APPOINTMENT (OUTPATIENT)
Dept: TRANSPLANT | Facility: CLINIC | Age: 24
End: 2024-08-01

## 2024-10-31 ENCOUNTER — NON-APPOINTMENT (OUTPATIENT)
Age: 24
End: 2024-10-31

## 2024-10-31 ENCOUNTER — LABORATORY RESULT (OUTPATIENT)
Age: 24
End: 2024-10-31

## 2024-10-31 ENCOUNTER — APPOINTMENT (OUTPATIENT)
Dept: NEPHROLOGY | Facility: CLINIC | Age: 24
End: 2024-10-31
Payer: MEDICAID

## 2024-10-31 VITALS
TEMPERATURE: 98.1 F | WEIGHT: 200 LBS | OXYGEN SATURATION: 99 % | DIASTOLIC BLOOD PRESSURE: 83 MMHG | BODY MASS INDEX: 33.32 KG/M2 | HEART RATE: 78 BPM | HEIGHT: 65 IN | SYSTOLIC BLOOD PRESSURE: 128 MMHG | RESPIRATION RATE: 16 BRPM

## 2024-10-31 DIAGNOSIS — Z94.0 KIDNEY TRANSPLANT STATUS: ICD-10-CM

## 2024-10-31 DIAGNOSIS — Z79.60 LONG TERM (CURRENT) USE OF UNSPECIFIED IMMUNOMODULATORS AND IMMUNOSUPPRESSANTS: ICD-10-CM

## 2024-10-31 DIAGNOSIS — I10 ESSENTIAL (PRIMARY) HYPERTENSION: ICD-10-CM

## 2024-10-31 LAB
ALBUMIN SERPL ELPH-MCNC: 4.5 G/DL
ALP BLD-CCNC: 98 U/L
ALT SERPL-CCNC: 26 U/L
ANION GAP SERPL CALC-SCNC: 12 MMOL/L
APPEARANCE: CLEAR
AST SERPL-CCNC: 25 U/L
BASOPHILS # BLD AUTO: 0.04 K/UL
BASOPHILS NFR BLD AUTO: 0.5 %
BILIRUB SERPL-MCNC: 0.8 MG/DL
BILIRUBIN URINE: NEGATIVE
BLOOD URINE: ABNORMAL
BUN SERPL-MCNC: 24 MG/DL
CALCIUM SERPL-MCNC: 9.9 MG/DL
CALCIUM SERPL-MCNC: 9.9 MG/DL
CHLORIDE SERPL-SCNC: 105 MMOL/L
CO2 SERPL-SCNC: 23 MMOL/L
COLOR: YELLOW
CREAT SERPL-MCNC: 1.16 MG/DL
CREAT SPEC-SCNC: 75 MG/DL
CREAT/PROT UR: 0.3 RATIO
EGFR: 89 ML/MIN/1.73M2
EOSINOPHIL # BLD AUTO: 0.18 K/UL
EOSINOPHIL NFR BLD AUTO: 2.2 %
GLUCOSE QUALITATIVE U: NEGATIVE MG/DL
GLUCOSE SERPL-MCNC: 101 MG/DL
HCT VFR BLD CALC: 43.9 %
HGB BLD-MCNC: 14.7 G/DL
IMM GRANULOCYTES NFR BLD AUTO: 0.4 %
KETONES URINE: NEGATIVE MG/DL
LEUKOCYTE ESTERASE URINE: NEGATIVE
LYMPHOCYTES # BLD AUTO: 3.44 K/UL
LYMPHOCYTES NFR BLD AUTO: 41.4 %
MAGNESIUM SERPL-MCNC: 1.7 MG/DL
MAN DIFF?: NORMAL
MCHC RBC-ENTMCNC: 29.2 PG
MCHC RBC-ENTMCNC: 33.5 G/DL
MCV RBC AUTO: 87.3 FL
MONOCYTES # BLD AUTO: 0.87 K/UL
MONOCYTES NFR BLD AUTO: 10.5 %
NEUTROPHILS # BLD AUTO: 3.75 K/UL
NEUTROPHILS NFR BLD AUTO: 45 %
NITRITE URINE: NEGATIVE
PARATHYROID HORMONE INTACT: 33 PG/ML
PH URINE: 6
PHOSPHATE SERPL-MCNC: 2.5 MG/DL
PLATELET # BLD AUTO: 261 K/UL
POTASSIUM SERPL-SCNC: 4.7 MMOL/L
PROT SERPL-MCNC: 8 G/DL
PROT UR-MCNC: 24 MG/DL
PROTEIN URINE: 30 MG/DL
RBC # BLD: 5.03 M/UL
RBC # FLD: 12.8 %
SODIUM SERPL-SCNC: 140 MMOL/L
SPECIFIC GRAVITY URINE: 1.01
TACROLIMUS SERPL-MCNC: 7.3 NG/ML
UROBILINOGEN URINE: 0.2 MG/DL
WBC # FLD AUTO: 8.31 K/UL

## 2024-10-31 PROCEDURE — 99214 OFFICE O/P EST MOD 30 MIN: CPT

## 2024-12-10 DIAGNOSIS — Z94.0 KIDNEY TRANSPLANT STATUS: ICD-10-CM

## 2024-12-26 DIAGNOSIS — Z94.0 KIDNEY TRANSPLANT STATUS: ICD-10-CM

## 2025-01-30 ENCOUNTER — APPOINTMENT (OUTPATIENT)
Dept: NEPHROLOGY | Facility: CLINIC | Age: 25
End: 2025-01-30
Payer: MEDICAID

## 2025-01-30 VITALS
TEMPERATURE: 98.6 F | DIASTOLIC BLOOD PRESSURE: 78 MMHG | RESPIRATION RATE: 16 BRPM | WEIGHT: 203 LBS | BODY MASS INDEX: 33.82 KG/M2 | HEART RATE: 85 BPM | OXYGEN SATURATION: 98 % | HEIGHT: 65 IN | SYSTOLIC BLOOD PRESSURE: 131 MMHG

## 2025-01-30 DIAGNOSIS — Z79.60 LONG TERM (CURRENT) USE OF UNSPECIFIED IMMUNOMODULATORS AND IMMUNOSUPPRESSANTS: ICD-10-CM

## 2025-01-30 DIAGNOSIS — Z94.0 KIDNEY TRANSPLANT STATUS: ICD-10-CM

## 2025-01-30 DIAGNOSIS — N18.9 CHRONIC KIDNEY DISEASE, UNSPECIFIED: ICD-10-CM

## 2025-01-30 LAB
ALBUMIN SERPL ELPH-MCNC: 4.5 G/DL
ALP BLD-CCNC: 127 U/L
ALT SERPL-CCNC: 24 U/L
ANION GAP SERPL CALC-SCNC: 13 MMOL/L
APPEARANCE: CLEAR
AST SERPL-CCNC: 21 U/L
BACTERIA: NEGATIVE /HPF
BASOPHILS # BLD AUTO: 0.05 K/UL
BASOPHILS NFR BLD AUTO: 0.7 %
BILIRUB SERPL-MCNC: 0.5 MG/DL
BILIRUBIN URINE: NEGATIVE
BLOOD URINE: ABNORMAL
BUN SERPL-MCNC: 23 MG/DL
CALCIUM SERPL-MCNC: 10.3 MG/DL
CALCIUM SERPL-MCNC: 10.3 MG/DL
CAST: 0 /LPF
CHLORIDE SERPL-SCNC: 107 MMOL/L
CHOLEST SERPL-MCNC: 157 MG/DL
CO2 SERPL-SCNC: 22 MMOL/L
COLOR: YELLOW
CREAT SERPL-MCNC: 1.21 MG/DL
CREAT SPEC-SCNC: 125 MG/DL
CREAT/PROT UR: 0.3 RATIO
EGFR: 85 ML/MIN/1.73M2
EOSINOPHIL # BLD AUTO: 0.16 K/UL
EOSINOPHIL NFR BLD AUTO: 2.2 %
EPITHELIAL CELLS: 0 /HPF
GLUCOSE QUALITATIVE U: NEGATIVE MG/DL
GLUCOSE SERPL-MCNC: 105 MG/DL
HCT VFR BLD CALC: 43.5 %
HDLC SERPL-MCNC: 43 MG/DL
HGB BLD-MCNC: 14.4 G/DL
IMM GRANULOCYTES NFR BLD AUTO: 0.1 %
KETONES URINE: NEGATIVE MG/DL
LDLC SERPL CALC-MCNC: 96 MG/DL
LEUKOCYTE ESTERASE URINE: NEGATIVE
LYMPHOCYTES # BLD AUTO: 3.2 K/UL
LYMPHOCYTES NFR BLD AUTO: 43.9 %
MAGNESIUM SERPL-MCNC: 1.6 MG/DL
MAN DIFF?: NORMAL
MCHC RBC-ENTMCNC: 28.5 PG
MCHC RBC-ENTMCNC: 33.1 G/DL
MCV RBC AUTO: 86.1 FL
MICROSCOPIC-UA: NORMAL
MONOCYTES # BLD AUTO: 0.74 K/UL
MONOCYTES NFR BLD AUTO: 10.2 %
NEUTROPHILS # BLD AUTO: 3.13 K/UL
NEUTROPHILS NFR BLD AUTO: 42.9 %
NITRITE URINE: NEGATIVE
NONHDLC SERPL-MCNC: 114 MG/DL
PARATHYROID HORMONE INTACT: 33 PG/ML
PH URINE: 5.5
PHOSPHATE SERPL-MCNC: 3.6 MG/DL
PLATELET # BLD AUTO: 259 K/UL
POTASSIUM SERPL-SCNC: 4.5 MMOL/L
PROT SERPL-MCNC: 7.8 G/DL
PROT UR-MCNC: 34 MG/DL
PROTEIN URINE: 30 MG/DL
RBC # BLD: 5.05 M/UL
RBC # FLD: 12.7 %
RED BLOOD CELLS URINE: 5 /HPF
SODIUM SERPL-SCNC: 141 MMOL/L
SPECIFIC GRAVITY URINE: 1.02
TACROLIMUS SERPL-MCNC: 8.6 NG/ML
TRIGL SERPL-MCNC: 95 MG/DL
UROBILINOGEN URINE: 0.2 MG/DL
WBC # FLD AUTO: 7.29 K/UL
WHITE BLOOD CELLS URINE: 0 /HPF

## 2025-01-30 PROCEDURE — 99214 OFFICE O/P EST MOD 30 MIN: CPT

## 2025-02-05 LAB
ESTIMATED AVERAGE GLUCOSE: 117 MG/DL
HBA1C MFR BLD HPLC: 5.7 %

## 2025-02-13 ENCOUNTER — APPOINTMENT (OUTPATIENT)
Dept: TRANSPLANT | Facility: CLINIC | Age: 25
End: 2025-02-13

## 2025-04-21 ENCOUNTER — NON-APPOINTMENT (OUTPATIENT)
Age: 25
End: 2025-04-21

## 2025-04-21 ENCOUNTER — APPOINTMENT (OUTPATIENT)
Dept: NEPHROLOGY | Facility: CLINIC | Age: 25
End: 2025-04-21
Payer: COMMERCIAL

## 2025-04-21 VITALS
OXYGEN SATURATION: 99 % | BODY MASS INDEX: 33.66 KG/M2 | HEIGHT: 65 IN | WEIGHT: 202 LBS | DIASTOLIC BLOOD PRESSURE: 77 MMHG | TEMPERATURE: 97.7 F | HEART RATE: 70 BPM | SYSTOLIC BLOOD PRESSURE: 126 MMHG | RESPIRATION RATE: 16 BRPM

## 2025-04-21 DIAGNOSIS — Z79.60 LONG TERM (CURRENT) USE OF UNSPECIFIED IMMUNOMODULATORS AND IMMUNOSUPPRESSANTS: ICD-10-CM

## 2025-04-21 DIAGNOSIS — I10 ESSENTIAL (PRIMARY) HYPERTENSION: ICD-10-CM

## 2025-04-21 DIAGNOSIS — Z94.0 KIDNEY TRANSPLANT STATUS: ICD-10-CM

## 2025-04-21 LAB
ALBUMIN SERPL ELPH-MCNC: 4.3 G/DL
ALP BLD-CCNC: 98 U/L
ALT SERPL-CCNC: 19 U/L
ANION GAP SERPL CALC-SCNC: 12 MMOL/L
APPEARANCE: CLEAR
AST SERPL-CCNC: 19 U/L
BACTERIA: NEGATIVE /HPF
BASOPHILS # BLD AUTO: 0.05 K/UL
BASOPHILS NFR BLD AUTO: 0.7 %
BILIRUB SERPL-MCNC: 0.8 MG/DL
BILIRUBIN URINE: NEGATIVE
BLOOD URINE: ABNORMAL
BUN SERPL-MCNC: 30 MG/DL
CALCIUM SERPL-MCNC: 9.8 MG/DL
CALCIUM SERPL-MCNC: 9.8 MG/DL
CAST: 0 /LPF
CHLORIDE SERPL-SCNC: 106 MMOL/L
CHOLEST SERPL-MCNC: 153 MG/DL
CO2 SERPL-SCNC: 24 MMOL/L
COLOR: YELLOW
CREAT SERPL-MCNC: 1.19 MG/DL
CREAT SPEC-SCNC: 119 MG/DL
CREAT/PROT UR: 0.3 RATIO
EGFRCR SERPLBLD CKD-EPI 2021: 86 ML/MIN/1.73M2
EOSINOPHIL # BLD AUTO: 0.25 K/UL
EOSINOPHIL NFR BLD AUTO: 3.3 %
EPITHELIAL CELLS: 0 /HPF
GLUCOSE QUALITATIVE U: NEGATIVE MG/DL
GLUCOSE SERPL-MCNC: 103 MG/DL
HCT VFR BLD CALC: 41.9 %
HDLC SERPL-MCNC: 46 MG/DL
HGB BLD-MCNC: 13.6 G/DL
IMM GRANULOCYTES NFR BLD AUTO: 0.1 %
KETONES URINE: NEGATIVE MG/DL
LDLC SERPL-MCNC: 92 MG/DL
LEUKOCYTE ESTERASE URINE: NEGATIVE
LYMPHOCYTES # BLD AUTO: 3.43 K/UL
LYMPHOCYTES NFR BLD AUTO: 45.3 %
MAGNESIUM SERPL-MCNC: 1.6 MG/DL
MAN DIFF?: NORMAL
MCHC RBC-ENTMCNC: 28.7 PG
MCHC RBC-ENTMCNC: 32.5 G/DL
MCV RBC AUTO: 88.4 FL
MICROSCOPIC-UA: NORMAL
MONOCYTES # BLD AUTO: 0.81 K/UL
MONOCYTES NFR BLD AUTO: 10.7 %
NEUTROPHILS # BLD AUTO: 3.02 K/UL
NEUTROPHILS NFR BLD AUTO: 39.9 %
NITRITE URINE: NEGATIVE
NONHDLC SERPL-MCNC: 107 MG/DL
PARATHYROID HORMONE INTACT: 54 PG/ML
PH URINE: 6
PHOSPHATE SERPL-MCNC: 3.1 MG/DL
PLATELET # BLD AUTO: 251 K/UL
POTASSIUM SERPL-SCNC: 4.2 MMOL/L
PROT SERPL-MCNC: 7.5 G/DL
PROT UR-MCNC: 31 MG/DL
PROTEIN URINE: 30 MG/DL
RBC # BLD: 4.74 M/UL
RBC # FLD: 12.5 %
RED BLOOD CELLS URINE: 4 /HPF
SODIUM SERPL-SCNC: 141 MMOL/L
SPECIFIC GRAVITY URINE: 1.02
TACROLIMUS SERPL-MCNC: 6.9 NG/ML
TRIGL SERPL-MCNC: 79 MG/DL
UROBILINOGEN URINE: 0.2 MG/DL
WBC # FLD AUTO: 7.57 K/UL
WHITE BLOOD CELLS URINE: 1 /HPF

## 2025-04-21 PROCEDURE — G2211 COMPLEX E/M VISIT ADD ON: CPT | Mod: NC

## 2025-04-21 PROCEDURE — 99213 OFFICE O/P EST LOW 20 MIN: CPT

## 2025-04-22 LAB
BKV DNA SPEC QL NAA+PROBE: 36 IU/ML
CMV DNA SPEC QL NAA+PROBE: NOT DETECTED IU/ML
CMVPCR LOG: NOT DETECTED LOG10IU/ML

## 2025-07-14 ENCOUNTER — APPOINTMENT (OUTPATIENT)
Dept: TRANSPLANT | Facility: CLINIC | Age: 25
End: 2025-07-14

## 2025-07-14 LAB
25(OH)D3 SERPL-MCNC: 24.6 NG/ML
ALBUMIN SERPL ELPH-MCNC: 4.5 G/DL
ALP BLD-CCNC: 112 U/L
ALT SERPL-CCNC: 25 U/L
ANION GAP SERPL CALC-SCNC: 14 MMOL/L
APPEARANCE: CLEAR
AST SERPL-CCNC: 26 U/L
BACTERIA: NEGATIVE /HPF
BASOPHILS # BLD AUTO: 0.04 K/UL
BASOPHILS NFR BLD AUTO: 0.5 %
BILIRUB SERPL-MCNC: 0.7 MG/DL
BILIRUBIN URINE: NEGATIVE
BLOOD URINE: ABNORMAL
BUN SERPL-MCNC: 27 MG/DL
CALCIUM SERPL-MCNC: 10.1 MG/DL
CALCIUM SERPL-MCNC: 10.1 MG/DL
CAST: 0 /LPF
CHLORIDE SERPL-SCNC: 104 MMOL/L
CO2 SERPL-SCNC: 21 MMOL/L
COLOR: YELLOW
CREAT SERPL-MCNC: 1.37 MG/DL
CREAT SPEC-SCNC: 116 MG/DL
CREAT/PROT UR: 0.2 RATIO
EGFRCR SERPLBLD CKD-EPI 2021: 73 ML/MIN/1.73M2
EOSINOPHIL # BLD AUTO: 0.25 K/UL
EOSINOPHIL NFR BLD AUTO: 3 %
EPITHELIAL CELLS: 0 /HPF
GLUCOSE QUALITATIVE U: NEGATIVE MG/DL
GLUCOSE SERPL-MCNC: 108 MG/DL
HCT VFR BLD CALC: 43.8 %
HGB BLD-MCNC: 14.2 G/DL
IMM GRANULOCYTES NFR BLD AUTO: 0.1 %
KETONES URINE: NEGATIVE MG/DL
LEUKOCYTE ESTERASE URINE: NEGATIVE
LYMPHOCYTES # BLD AUTO: 3.5 K/UL
LYMPHOCYTES NFR BLD AUTO: 41.9 %
MAGNESIUM SERPL-MCNC: 1.5 MG/DL
MAN DIFF?: NORMAL
MCHC RBC-ENTMCNC: 28.7 PG
MCHC RBC-ENTMCNC: 32.4 G/DL
MCV RBC AUTO: 88.7 FL
MICROSCOPIC-UA: NORMAL
MONOCYTES # BLD AUTO: 0.9 K/UL
MONOCYTES NFR BLD AUTO: 10.8 %
NEUTROPHILS # BLD AUTO: 3.65 K/UL
NEUTROPHILS NFR BLD AUTO: 43.7 %
NITRITE URINE: NEGATIVE
PARATHYROID HORMONE INTACT: 33 PG/ML
PH URINE: 6
PHOSPHATE SERPL-MCNC: 2.8 MG/DL
PLATELET # BLD AUTO: 242 K/UL
POTASSIUM SERPL-SCNC: 4.3 MMOL/L
PROT SERPL-MCNC: 7.8 G/DL
PROT UR-MCNC: 27 MG/DL
PROTEIN URINE: 30 MG/DL
RBC # BLD: 4.94 M/UL
RBC # FLD: 12.6 %
RED BLOOD CELLS URINE: 3 /HPF
SODIUM SERPL-SCNC: 138 MMOL/L
SPECIFIC GRAVITY URINE: 1.02
TACROLIMUS SERPL-MCNC: 8.3 NG/ML
UROBILINOGEN URINE: 0.2 MG/DL
WBC # FLD AUTO: 8.35 K/UL
WHITE BLOOD CELLS URINE: 0 /HPF

## 2025-07-28 ENCOUNTER — APPOINTMENT (OUTPATIENT)
Dept: TRANSPLANT | Facility: CLINIC | Age: 25
End: 2025-07-28